# Patient Record
Sex: FEMALE | Race: WHITE | NOT HISPANIC OR LATINO | Employment: FULL TIME | URBAN - METROPOLITAN AREA
[De-identification: names, ages, dates, MRNs, and addresses within clinical notes are randomized per-mention and may not be internally consistent; named-entity substitution may affect disease eponyms.]

---
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
## 2023-08-18 ENCOUNTER — OFFICE VISIT (OUTPATIENT)
Dept: OBSTETRICS AND GYNECOLOGY | Facility: CLINIC | Age: 23
End: 2023-08-18
Payer: COMMERCIAL

## 2023-08-18 VITALS
BODY MASS INDEX: 28.51 KG/M2 | WEIGHT: 146 LBS | SYSTOLIC BLOOD PRESSURE: 114 MMHG | DIASTOLIC BLOOD PRESSURE: 80 MMHG | HEART RATE: 101 BPM

## 2023-08-18 DIAGNOSIS — Z30.432 ENCOUNTER FOR IUD REMOVAL: Primary | ICD-10-CM

## 2023-08-18 RX ORDER — ONDANSETRON 4 MG/1
TABLET, ORALLY DISINTEGRATING ORAL
COMMUNITY
Start: 2023-08-17

## 2023-08-18 RX ORDER — CEPHALEXIN 250 MG/1
CAPSULE ORAL
COMMUNITY
Start: 2023-08-17

## 2023-08-18 RX ORDER — FLUCONAZOLE 150 MG/1
TABLET ORAL
COMMUNITY
Start: 2023-08-17

## 2023-08-18 RX ORDER — KETOROLAC TROMETHAMINE 10 MG/1
TABLET, FILM COATED ORAL
COMMUNITY
Start: 2023-08-17

## 2023-08-18 RX ORDER — TAMSULOSIN HYDROCHLORIDE 0.4 MG/1
CAPSULE ORAL
COMMUNITY
Start: 2023-08-17

## 2023-08-18 NOTE — PROGRESS NOTES
Procedures  IUD Removal Procedure Note    Type of IUD:  Mirena  Date of insertion:  known  Reason for removal:  Desires pregnancy  Other relevant history/information:  none    Procedure Time Documentation  The risks of the procedure were reviewed with the patient including bleeding, infection and unlikely damage to the uterus and the benefits of the procedure were explained to the patient and Written informed consent was obtained    Procedure Details  IUD strings visible:  yes  Local anesthesia:  None,   Tenaculum used:  None  Removal:  IUD strings grasped and IUD removed intact with gentle traction.  The patient tolerated the procedure well.    All appropriate instructions regarding removal were reviewed.    Patient tolerated the procedure well without complications.    Plans for contraception:  no method    Other follow-up needed:  Annual exam    The patient was advised to call for any fever or for prolonged or severe pain or bleeding. She was advised to use OTC ibuprofen as needed for mild to moderate pain.     Jean Senior, APRGERTRUDIS  8/18/2023  09:21 EDT

## 2023-09-27 ENCOUNTER — TELEPHONE (OUTPATIENT)
Dept: OBSTETRICS AND GYNECOLOGY | Facility: CLINIC | Age: 23
End: 2023-09-27
Payer: COMMERCIAL

## 2023-09-27 NOTE — TELEPHONE ENCOUNTER
Provider: DR. MORROW    Caller: GYPSY NEWTON    Relationship to Patient: SELF    Pharmacy:     Phone Number: [phone]    Reason for Call: APPOINTMENT FOR FAMILY PLANNING    When was the patient last seen: 08.18.23    PATIENT CALLING WANTING TO SCHEDULE AN APPOINTMENT WITH DR. MORROW. PATIENT STATED THAT HER GRANDMOTHER AMBER ALTMAN  HAD AN APPOINTMENT WITH DR. MORROW THIS SEPTEMBER AND SAID DR. MORROW WOULD LIKE LIKE TO SEE PATIENT FOR FAMILY PLANNING.      PATIENT CAN BE REACHED [phone]     THANK YOU

## 2023-10-24 NOTE — PROGRESS NOTES
GYN Visit    Chief Complaint   Patient presents with    Discuss Family Planning       HPI:   22 y.o. LMP: Patient's last menstrual period was 10/26/2023 (exact date).     Social History     Substance and Sexual Activity   Sexual Activity Yes    Partners: Male    Birth control/protection: None     Telephone with Jeanine Garcia DO (2023)    IUD out since Aug- used for heavy menses  2 menses since, lasted 5-7 days, changes q1hr to be clean.  Painful- OTC meds help  + faint line w ov pred kits, D13    History: PMHx, Meds, Allergies, PSHx, Social Hx, and POBHx all reviewed and updated.    PHYSICAL EXAM:  /74   Wt 62.1 kg (137 lb)   LMP 10/26/2023 (Exact Date)   BMI 26.76 kg/m²   Facility age limit for growth %verena is 20 years.  General- NAD, alert and oriented, appropriate  Psych- Normal mood, good memory      ASSESSMENT AND PLAN:  Diagnoses and all orders for this visit:    1. Desire for pregnancy (Primary)  -     Inheritest (R) CF/SMA Panel - Blood,; Future  -     Inheritest (R) CF/SMA Panel - Blood, Arm, Left    2. Cerebral palsy  Overview:  Mild, left hand and left toes, limps w exhaustion    Assessment & Plan:  We discussed this should not affect her pregnancy, however at the end of pregnancy when she becomes more fatigued we do need to watch out for her symptoms worsening, and assuring she is not increase her risk for falls      3. Hx of Menorrhagia with regular cycle  Comments:  Improved w IUD- now removed    Cont PNV, healthy lifestyle, and no caffeine    Today we discussed ovulation, and timing intercourse with LH surge    We discussed an appropriate body mass index of less than 35 with pregnancy.  Ideal weight reviewed.  127 pounds or less.    We discussed optional testing of CF/SMA today.  Patient desires testing.          Follow Up:  Return With positive pregnancy test.          Jeanine Garcia DO  10/30/2023    [facility] [place] [facility]  [address]  [address] [place] [address]  Dept: [phone]  Dept Fax: [phone]  Loc: [phone]  Loc Fax: [phone]

## 2023-10-30 ENCOUNTER — OFFICE VISIT (OUTPATIENT)
Dept: OBSTETRICS AND GYNECOLOGY | Facility: CLINIC | Age: 23
End: 2023-10-30
Payer: COMMERCIAL

## 2023-10-30 VITALS — DIASTOLIC BLOOD PRESSURE: 74 MMHG | SYSTOLIC BLOOD PRESSURE: 116 MMHG | BODY MASS INDEX: 26.76 KG/M2 | WEIGHT: 137 LBS

## 2023-10-30 DIAGNOSIS — N92.0 MENORRHAGIA WITH REGULAR CYCLE: ICD-10-CM

## 2023-10-30 DIAGNOSIS — Z31.9 DESIRE FOR PREGNANCY: Primary | ICD-10-CM

## 2023-10-30 DIAGNOSIS — G80.8 OTHER CEREBRAL PALSY: ICD-10-CM

## 2023-10-30 NOTE — ASSESSMENT & PLAN NOTE
We discussed this should not affect her pregnancy, however at the end of pregnancy when she becomes more fatigued we do need to watch out for her symptoms worsening, and assuring she is not increase her risk for falls

## 2023-10-30 NOTE — PATIENT INSTRUCTIONS
Venipuncture Blood Specimen Collection  Venipuncture performed in left arm by Agata Lobato with good hemostasis. Patient tolerated the procedure well without complications.   10/30/23   Agata Lobato

## 2023-11-08 LAB
CITATION REF LAB TEST: NORMAL
ETHNIC BACKGROUND STATED: NORMAL
GENE DIS ANL CARRIER INTERP-IMP: NORMAL
GENE STUDIED ID: NORMAL
LAB DIRECTOR NAME PROVIDER: NORMAL
Lab: NORMAL
MOL DX INTERP BLD/T QL: NORMAL
REASON FOR REFERRAL (NARRATIVE): NORMAL
RECOMMENDATION PATIENT DOC-IMP: NORMAL
REF LAB TEST METHOD: NORMAL
SERVICE CMNT-IMP: NORMAL
SPECIMEN SOURCE: NORMAL

## 2023-11-10 ENCOUNTER — TELEPHONE (OUTPATIENT)
Dept: OBSTETRICS AND GYNECOLOGY | Facility: CLINIC | Age: 23
End: 2023-11-10
Payer: COMMERCIAL

## 2023-11-10 NOTE — TELEPHONE ENCOUNTER
----- Message from Jeanine Garcia DO sent at 11/9/2023  5:08 PM EST -----  Results for cystic fibrosis and spinal muscular atrophy were both negative.  She will not ever need to be tested again for either 1 of these.  Have her contact our office when she gets her first positive home pregnancy test.

## 2023-11-22 NOTE — TELEPHONE ENCOUNTER
Left a message for the patient to discuss her lab results.  Letter sent to the patient to call to discuss.

## 2023-12-15 ENCOUNTER — TELEPHONE (OUTPATIENT)
Dept: OBSTETRICS AND GYNECOLOGY | Facility: CLINIC | Age: 23
End: 2023-12-15
Payer: COMMERCIAL

## 2023-12-15 DIAGNOSIS — O36.80X0 PREGNANCY WITH INCONCLUSIVE FETAL VIABILITY, SINGLE OR UNSPECIFIED FETUS: Primary | ICD-10-CM

## 2023-12-15 DIAGNOSIS — Z32.00 PREGNANCY EXAMINATION OR TEST, PREGNANCY UNCONFIRMED: ICD-10-CM

## 2023-12-15 NOTE — TELEPHONE ENCOUNTER
Pt called stating she got a positive at home pregnancy test. Her LMP Was 11/22. She called in to schedule an appointment for her initial ob. She stated she was told that you would see her for her entire pregnancy and would work her in for the first initial visit. Is this okay? I have attached order for bhcg and ultrasound.

## 2023-12-18 ENCOUNTER — LAB (OUTPATIENT)
Dept: LAB | Facility: HOSPITAL | Age: 23
End: 2023-12-18
Payer: COMMERCIAL

## 2023-12-18 ENCOUNTER — TELEPHONE (OUTPATIENT)
Dept: OBSTETRICS AND GYNECOLOGY | Facility: CLINIC | Age: 23
End: 2023-12-18
Payer: COMMERCIAL

## 2023-12-18 DIAGNOSIS — Z32.00 PREGNANCY EXAMINATION OR TEST, PREGNANCY UNCONFIRMED: ICD-10-CM

## 2023-12-18 DIAGNOSIS — Z3A.01 LESS THAN 8 WEEKS GESTATION OF PREGNANCY: Primary | ICD-10-CM

## 2023-12-18 LAB — HCG INTACT+B SERPL-ACNC: 274.2 MIU/ML

## 2023-12-18 PROCEDURE — 84702 CHORIONIC GONADOTROPIN TEST: CPT

## 2023-12-18 PROCEDURE — 36415 COLL VENOUS BLD VENIPUNCTURE: CPT

## 2023-12-18 NOTE — TELEPHONE ENCOUNTER
Discussed results with pt. She is going to try to go to [facility] on Wednesday to get repeat HCG. She is scheduled for her ultrasound and follow up on 01/09/24. Please sign attached order for repeat hcg.

## 2023-12-18 NOTE — TELEPHONE ENCOUNTER
----- Message from Jeanine Garcia DO sent at 12/18/2023 12:21 PM EST -----  Preg test positive, consistent w early  preg.  Please order a repeat [facility] today or tomorrow and US at approx 6weeks GA w OV after for confirmation of preg.  First tri precautions.

## 2023-12-18 NOTE — TELEPHONE ENCOUNTER
Caller: Fidel NEWTON    Relationship: Self    Best call back number: [phone]    Caller requesting test results: PT    What test was performed: HCG     When was the test performed: 12/18/23    Where was the test performed: HOSPITAL     Additional notes: PT WOULD LIKE TO SPEAK TO SOMEONE REGARDING RESULTS

## 2023-12-20 ENCOUNTER — LAB (OUTPATIENT)
Dept: LAB | Facility: HOSPITAL | Age: 23
End: 2023-12-20
Payer: COMMERCIAL

## 2023-12-20 DIAGNOSIS — Z3A.01 LESS THAN 8 WEEKS GESTATION OF PREGNANCY: ICD-10-CM

## 2023-12-20 LAB — HCG INTACT+B SERPL-ACNC: 689 MIU/ML

## 2023-12-20 PROCEDURE — 36415 COLL VENOUS BLD VENIPUNCTURE: CPT

## 2023-12-20 PROCEDURE — 84702 CHORIONIC GONADOTROPIN TEST: CPT

## 2023-12-31 ENCOUNTER — HOSPITAL ENCOUNTER (EMERGENCY)
Facility: HOSPITAL | Age: 23
Discharge: HOME | End: 2024-01-01
Attending: EMERGENCY MEDICINE | Admitting: EMERGENCY MEDICINE
Payer: COMMERCIAL

## 2023-12-31 ENCOUNTER — APPOINTMENT (OUTPATIENT)
Dept: ULTRASOUND IMAGING | Facility: HOSPITAL | Age: 23
End: 2023-12-31
Payer: COMMERCIAL

## 2023-12-31 DIAGNOSIS — N93.9 VAGINAL BLEEDING: Primary | ICD-10-CM

## 2023-12-31 LAB
ABO GROUP BLD: NORMAL
BASOPHILS # BLD AUTO: 0.05 10*3/MM3 (ref 0–0.2)
BASOPHILS NFR BLD AUTO: 0.5 % (ref 0–1.5)
DEPRECATED RDW RBC AUTO: 39.8 FL (ref 37–54)
EOSINOPHIL # BLD AUTO: 0.3 10*3/MM3 (ref 0–0.4)
EOSINOPHIL NFR BLD AUTO: 3 % (ref 0.3–6.2)
ERYTHROCYTE [DISTWIDTH] IN BLOOD BY AUTOMATED COUNT: 12.1 % (ref 12.3–15.4)
HCG INTACT+B SERPL-ACNC: NORMAL MIU/ML
HCT VFR BLD AUTO: 39.5 % (ref 34–46.6)
HGB BLD-MCNC: 13 G/DL (ref 12–15.9)
HOLD SPECIMEN: NORMAL
HOLD SPECIMEN: NORMAL
IMM GRANULOCYTES # BLD AUTO: 0.02 10*3/MM3 (ref 0–0.05)
IMM GRANULOCYTES NFR BLD AUTO: 0.2 % (ref 0–0.5)
LYMPHOCYTES # BLD AUTO: 2.89 10*3/MM3 (ref 0.7–3.1)
LYMPHOCYTES NFR BLD AUTO: 28.8 % (ref 19.6–45.3)
MCH RBC QN AUTO: 29.5 PG (ref 26.6–33)
MCHC RBC AUTO-ENTMCNC: 32.9 G/DL (ref 31.5–35.7)
MCV RBC AUTO: 89.6 FL (ref 79–97)
MONOCYTES # BLD AUTO: 1 10*3/MM3 (ref 0.1–0.9)
MONOCYTES NFR BLD AUTO: 10 % (ref 5–12)
NEUTROPHILS NFR BLD AUTO: 5.78 10*3/MM3 (ref 1.7–7)
NEUTROPHILS NFR BLD AUTO: 57.5 % (ref 42.7–76)
NRBC BLD AUTO-RTO: 0 /100 WBC (ref 0–0.2)
PLATELET # BLD AUTO: 352 10*3/MM3 (ref 140–450)
PMV BLD AUTO: 9.9 FL (ref 6–12)
RBC # BLD AUTO: 4.41 10*6/MM3 (ref 3.77–5.28)
RH BLD: POSITIVE
WBC NRBC COR # BLD AUTO: 10.04 10*3/MM3 (ref 3.4–10.8)
WHOLE BLOOD HOLD COAG: NORMAL
WHOLE BLOOD HOLD SPECIMEN: NORMAL

## 2023-12-31 PROCEDURE — 86901 BLOOD TYPING SEROLOGIC RH(D): CPT

## 2023-12-31 PROCEDURE — 84702 CHORIONIC GONADOTROPIN TEST: CPT | Performed by: EMERGENCY MEDICINE

## 2023-12-31 PROCEDURE — 76817 TRANSVAGINAL US OBSTETRIC: CPT

## 2023-12-31 PROCEDURE — 86900 BLOOD TYPING SEROLOGIC ABO: CPT

## 2023-12-31 PROCEDURE — 85025 COMPLETE CBC W/AUTO DIFF WBC: CPT | Performed by: EMERGENCY MEDICINE

## 2023-12-31 PROCEDURE — 99284 EMERGENCY DEPT VISIT MOD MDM: CPT

## 2023-12-31 PROCEDURE — 36415 COLL VENOUS BLD VENIPUNCTURE: CPT

## 2023-12-31 RX ORDER — SODIUM CHLORIDE 0.9 % (FLUSH) 0.9 %
10 SYRINGE (ML) INJECTION AS NEEDED
Status: DISCONTINUED | OUTPATIENT
Start: 2023-12-31 | End: 2024-01-01 | Stop reason: HOSPADM

## 2024-01-01 VITALS
HEART RATE: 87 BPM | TEMPERATURE: 98.7 F | SYSTOLIC BLOOD PRESSURE: 132 MMHG | HEIGHT: 60 IN | OXYGEN SATURATION: 99 % | DIASTOLIC BLOOD PRESSURE: 78 MMHG | BODY MASS INDEX: 25.58 KG/M2 | RESPIRATION RATE: 18 BRPM | WEIGHT: 130.29 LBS

## 2024-01-01 NOTE — DISCHARGE INSTRUCTIONS
Take all medications as prescribed. Read and follow educational instructions provided to you in discharge packet. If symptoms worsen or fail to improve as anticipated return to ER.  Follow-up with your OB/GYN soon as possible. patient agrees to treatment plan.

## 2024-01-01 NOTE — ED PROVIDER NOTES
Time: 11:42 PM EST  Date of encounter:  12/31/2023  Independent Historian/Clinical History and Information was obtained by:   Patient  Chief Complaint: Vaginal bleeding, pregnant    History is limited by: N/A    History of Present Illness:  Patient is a 23 y.o. year old female who presents to the emergency department for evaluation of vaginal bleeding.  States that she started spotting earlier this morning after intercourse and now has saturated 2 pads.  Denies abdominal cramping but has lower back pain.  Patient states that she is 6 weeks pregnant.  Has not been evaluated by her OB/GYN.  Last menstrual period was November 22, 2023.  This is her first pregnancy.    HPI    Patient Care Team  Primary Care Provider: Snellen, Danielle, APRN    Past Medical History:     No Known Allergies  Past Medical History:   Diagnosis Date    Anxiety     Kidney stone     Migraine      Past Surgical History:   Procedure Laterality Date    KIDNEY STONE SURGERY       Family History   Problem Relation Age of Onset    Breast cancer Maternal Aunt         Mat. Great Great Aunt    Colon cancer Maternal Aunt         Mat. Great Great Aunt    Kidney cancer Maternal Uncle         Mat. Great Great Uncle    Lung cancer Maternal Uncle         Mat Great Great Uncle    Breast cancer Maternal Cousin         3rd Cousin    Thyroid disease Neg Hx     Ovarian cancer Neg Hx     Uterine cancer Neg Hx     Pulmonary embolism Neg Hx     Deep vein thrombosis Neg Hx        Home Medications:  Prior to Admission medications    Not on File        Social History:   Social History     Tobacco Use    Smoking status: Never    Smokeless tobacco: Never   Vaping Use    Vaping Use: Never used   Substance Use Topics    Alcohol use: Never    Drug use: Never         Review of Systems:  Review of Systems   Constitutional:  Negative for chills and fever.   HENT:  Negative for congestion, ear pain and sore throat.    Eyes:  Negative for pain.   Respiratory:  Negative for cough,  "chest tightness and shortness of breath.    Cardiovascular:  Negative for chest pain.   Gastrointestinal:  Negative for abdominal pain, diarrhea, nausea and vomiting.   Genitourinary:  Positive for vaginal bleeding. Negative for flank pain, hematuria and vaginal discharge.   Musculoskeletal:  Positive for back pain. Negative for joint swelling.   Skin:  Negative for pallor.   Neurological:  Negative for seizures and headaches.   All other systems reviewed and are negative.         Physical Exam:  /78   Pulse 87   Temp 98.7 °F (37.1 °C) (Oral)   Resp 18   Ht 152.4 cm (60\")   Wt 59.1 kg (130 lb 4.7 oz)   LMP 11/22/2023   SpO2 99%   BMI 25.45 kg/m²     Physical Exam  Vitals and nursing note reviewed. Exam conducted with a chaperone present.   Constitutional:       General: She is not in acute distress.     Appearance: Normal appearance. She is not toxic-appearing.   HENT:      Head: Normocephalic and atraumatic.      Mouth/Throat:      Mouth: Mucous membranes are moist.   Eyes:      General: No scleral icterus.     Conjunctiva/sclera: Conjunctivae normal.   Cardiovascular:      Rate and Rhythm: Normal rate and regular rhythm.      Pulses: Normal pulses.      Heart sounds: Normal heart sounds.   Pulmonary:      Effort: Pulmonary effort is normal. No respiratory distress.      Breath sounds: Normal breath sounds. No wheezing.   Abdominal:      General: Abdomen is flat.      Palpations: Abdomen is soft.      Tenderness: There is no abdominal tenderness.   Genitourinary:     Exam position: Supine.      Pubic Area: No rash.       Comments: Chaperone present Anna Rincon RN.  Blood noted in vaginal vault.  Musculoskeletal:         General: Normal range of motion.      Cervical back: Normal range of motion and neck supple.   Skin:     General: Skin is warm and dry.      Capillary Refill: Capillary refill takes less than 2 seconds.   Neurological:      Mental Status: She is alert and oriented to person, " place, and time. Mental status is at baseline.   Psychiatric:         Judgment: Judgment normal.         Vital signs were reviewed under triage note.            Procedures:  Procedures      Medical Decision Making:      Comorbidities that affect care:    Kidney stone, anxiety, migraine    External Notes reviewed:    Previous Clinic Note: 10/30/2023 GYN to discuss family planning      The following orders were placed and all results were independently analyzed by me:  Orders Placed This Encounter   Procedures    US Ob Transvaginal    Bandy Draw    hCG, Quantitative, Pregnancy    CBC Auto Differential    NPO Diet NPO Type: Strict NPO    Undress & Gown    Vital Signs    Orthostatic Blood Pressure    Supplies To Bedside - Notify MD When Ready- Pelvic Cart / Set Up    Pulse Oximetry    Oxygen Therapy- Nasal Cannula; Titrate 1-6 LPM Per SpO2; 90 - 95%    ABO / Rh    Insert Peripheral IV    CBC & Differential    Green Top (Gel)    Lavender Top    Gold Top - SST    Light Blue Top       Medications Given in the Emergency Department:  Medications   sodium chloride 0.9 % flush 10 mL (has no administration in time range)        ED Course:        Labs:    Lab Results (last 24 hours)       Procedure Component Value Units Date/Time    CBC & Differential [[id number]]  (Abnormal) Collected: 12/31/23 2103    Specimen: Blood Updated: 12/31/23 2117    Narrative:      The following orders were created for panel order CBC & Differential.  Procedure                               Abnormality         Status                     ---------                               -----------         ------                     CBC Auto Differential[[id number]]        Abnormal            Final result                 Please view results for these tests on the individual orders.    hCG, Quantitative, Pregnancy [[id number]] Collected: 12/31/23 2103    Specimen: Blood Updated: 12/31/23 2149     HCG Quantitative 21,247.00 mIU/mL     Narrative:      HCG Ranges by  Gestational Age    Females - non-pregnant premenopausal   </= 1mIU/mL HCG  Females - postmenopausal               </= 7mIU/mL HCG    3 Weeks       5.4   -      72 mIU/mL  4 Weeks      10.2   -     708 mIU/mL  5 Weeks       217   -   8,245 mIU/mL  6 Weeks       152   -  32,177 mIU/mL  7 Weeks     4,059   - 153,767 mIU/mL  8 Weeks    31,366   - 149,094 mIU/mL  9 Weeks    59,109   - 135,901 mIU/mL  10 Weeks   44,186   - 170,409 mIU/mL  12 Weeks   27,107   - 201,615 mIU/mL  14 Weeks   24,302   -  93,646 mIU/mL  15 Weeks   12,540   -  69,747 mIU/mL  16 Weeks    8,904   -  55,332 mIU/mL  17 Weeks    8,240   -  51,793 mIU/mL  18 Weeks    9,649   -  55,271 mIU/mL      CBC Auto Differential [[id number]]  (Abnormal) Collected: 12/31/23 2103    Specimen: Blood Updated: 12/31/23 2117     WBC 10.04 10*3/mm3      RBC 4.41 10*6/mm3      Hemoglobin 13.0 g/dL      Hematocrit 39.5 %      MCV 89.6 fL      MCH 29.5 pg      MCHC 32.9 g/dL      RDW 12.1 %      RDW-SD 39.8 fl      MPV 9.9 fL      Platelets 352 10*3/mm3      Neutrophil % 57.5 %      Lymphocyte % 28.8 %      Monocyte % 10.0 %      Eosinophil % 3.0 %      Basophil % 0.5 %      Immature Grans % 0.2 %      Neutrophils, Absolute 5.78 10*3/mm3      Lymphocytes, Absolute 2.89 10*3/mm3      Monocytes, Absolute 1.00 10*3/mm3      Eosinophils, Absolute 0.30 10*3/mm3      Basophils, Absolute 0.05 10*3/mm3      Immature Grans, Absolute 0.02 10*3/mm3      nRBC 0.0 /100 WBC              Imaging:    US Ob Transvaginal    Result Date: 1/1/2024  PROCEDURE: US OB TRANSVAGINAL  COMPARISON: None.  INDICATIONS: 23-year-old female w/ vaginal bleeding in pregnancy; the quantitative beta-hCG is 21,247 mIU/mL.  TECHNIQUE: Ultrasound examination of the pelvis was performed, using endovaginal/transvaginal technique.  A limited obstetric survey was performed.   FINDINGS: Two-dimensional grayscale images as well as color and spectral Doppler analysis are provided for review.  Again,  transvaginal/endovaginal technique was utilized.  A single intrauterine gestational sac is identified.  It contains a single yolk sac with a maximum diameter of 2.7 cm.  The mean gestational sac diameter of 1.26 cm predicts a gestational age of 5 weeks, 3 days.  The gestational sac contains an early single embryonic (or fetal) pole.  The mean crown-rump length is 0.18 cm, which is out of range (OOR) for prediction of gestational age.  No embryonic cardiac activity is detected at this time.  The clinical gestational age, based upon the last menstrual period (LMP) of 11/22/2023, is 5 weeks, 4 days.  The estimated delivery date clinically is 8/28/2024.  The ultrasound-predicted delivery date is 8/29/2024.  The endocervical canal is nondilated and measures 4.2 cm in length.  Minimal, if any, free fluid is identified in the cul-de-sac.  The uterus measures 7.2 x 3.9 x 4.3 cm.  The right ovary measures 3.2 x 2.4 x 2.3 cm.  The left ovary measures 2.7 x 0.9 x 1.3 cm.  The uterine volume is approximately 62.6 mL.  The right ovarian volume is approximately 9 mL.  The left ovarian volume is approximately 1.8 mL.  There is a suspected 2.1 cm right corpus luteal cyst, as seen on image 29.  There is a left adnexal cyst, which measures about 1.5 cm in greatest diameter.  No perigestational hemorrhage is seen.  No suspicious uterine or adnexal mass.  No ovarian torsion is identified.  That is, normal blood flow involves the bilateral ovaries by duplex ultrasound examination.         1. A single intrauterine gestational sac is identified, which contains a single yolk sac and a suspected single early embryonic pole.  No embryonic cardiac activity is detected at this time.  Consider close interval clinical, lab, and imaging follow-up of the findings to ensure a benign progression and to ensure a viable intrauterine gestation.  2. No suspicious uterine or adnexal masses are seen. 3. No ovarian torsion is identified, bilaterally. 4.  Minimal (if any) free fluid is seen in the cul-de-sac. 5. The other findings are as detailed above.    Please note that portions of this note were completed with a voice recognition program.  RAUL VELASQUEZ JR, MD       Electronically Signed and Approved By: RAUL VELASQUEZ JR, MD on 1/01/2024 at 0:24                 Differential Diagnosis and Discussion:    Vaginal Bleeding: Differential diagnosis includes but is not limited to foreign body, tumor, vaginitis, dysfunctional uterine bleeding, endocrine abnormalities, coagulation disorder, systemic illness, polyps, complications of pregnancy (possible ectopic pregnancy).    All labs were reviewed and interpreted by me.  Ultrasound impression was interpreted by me.     MDM     Amount and/or Complexity of Data Reviewed  Clinical lab tests: reviewed  Tests in the radiology section of CPT®: reviewed    Risk of Complications, Morbidity, and/or Mortality  Presenting problems: moderate  Diagnostic procedures: moderate  Management options: moderate         Patient Care Considerations:    CT ABDOMEN AND PELVIS: I considered ordering a CT scan of the abdomen and pelvis however no abdominal pain.      Consultants/Shared Management Plan:    None    Social Determinants of Health:    Patient is independent, reliable, and has access to care.       Disposition and Care Coordination:    Discharged: The patient is suitable and stable for discharge with no need for consideration of observation or admission.    [unfilled]  I have explained discharge medications and the need for follow up with the patient/caretakers. This was also printed in the discharge instructions. Patient was discharged with the following medications and follow up:      Medication List      No changes were made to your prescriptions during this visit.      Snellen, Danielle, APRN  [address] [address] [phone]    Schedule an appointment as soon as possible for a visit   As needed        Final diagnoses:   Vaginal bleeding        ED Disposition       ED Disposition   Discharge    Condition   Stable    Comment   --               This medical record created using voice recognition software.             Yuridia León, APRGERTRUDIS  01/01/24 0049       Yuridia León, APRN  01/01/24 0153

## 2024-01-02 ENCOUNTER — TELEPHONE (OUTPATIENT)
Dept: OBSTETRICS AND GYNECOLOGY | Facility: CLINIC | Age: 24
End: 2024-01-02
Payer: COMMERCIAL

## 2024-01-02 NOTE — TELEPHONE ENCOUNTER
Contacted patient to let her know that I had spoken with her insurance and [facility] about the bill she received for CF/SMA testing. It looked as though the ICD-10 codes were incorrect. I spoke with Zoe at [employer] and sent her the corrected info and they will correct the claim and resubmit it.

## 2024-01-03 NOTE — PROGRESS NOTES
GYN Visit    Chief Complaint   Patient presents with    Bleeding       HPI:   23 y.o. LMP: Patient's last menstrual period was 2023.     Social History     Substance and Sexual Activity   Sexual Activity Yes    Partners: Male     US Ob Transvaginal (2023 22:43) ultrasound in ER  showed Gsac and yolk sac.  Patient had vaginal bleeding.  Beta-hCG levels have been rising appropriately.  hCG, Quantitative, Pregnancy (2023 21:03)    US today: viable IUP cw JEANINE by LMP    Nausea and vomiting  No VB since ER visit    History: PMHx, Meds, Allergies, PSHx, Social Hx, and POBHx all reviewed and updated.    PHYSICAL EXAM:  /83 (BP Location: Right arm, Patient Position: Sitting)   Pulse 82   Wt 59.7 kg (131 lb 9.6 oz)   LMP 2023   SpO2 98%   BMI 25.70 kg/m²   Facility age limit for growth %verena is 20 years.  General- NAD, alert and oriented, appropriate  Psych- Normal mood, good memory      ASSESSMENT AND PLAN:  Diagnoses and all orders for this visit:    1. Bleeding in early pregnancy (Primary)    2. Nausea and vomiting during pregnancy  -     ondansetron (ZOFRAN) 4 MG tablet; Take 1 tablet by mouth Every 8 (Eight) Hours As Needed for Nausea or Vomiting.  Dispense: 30 tablet; Refill: 1  -     doxylamine (UNISOM) 25 MG tablet; Take 1 tablet by mouth Daily As Needed for Sleep or Nausea (or anxiety). May take and extra 1/2 tablet PRN persistent nausea or anxiety  Dispense: 30 tablet; Refill: 1  -     vitamin B-6 (PYRIDOXINE) 25 MG tablet; Take 1 tablet by mouth 2 (Two) Times a Day As Needed (Nausea).  Dispense: 60 tablet; Refill: 1    Other orders  -     Prenatal Vit-Fe Sulfate-FA-DHA (Prenatal Vitamin/Min +DHA) 27-0.8-200 MG capsule; Take 1 each by mouth Daily.  Dispense: 90 capsule; Refill: 4    Discussed appropriate diet with nausea and vomiting in pregnancy.  Increased carbohydrates and easily digestible foods.  Avoid spicy, high fatty, and vegetables.        Follow  Up:  Return in about 4 weeks (around 2/6/2024) for New OB OV, then Ov 4weeks later.          Jeanine Garcia,   01/09/2024    [facility] [place] [facility]  [address] [place] [address]  Dept: [phone]  Dept Fax: [phone]  Loc: [phone]  Loc Fax: [phone]

## 2024-01-09 ENCOUNTER — OFFICE VISIT (OUTPATIENT)
Dept: OBSTETRICS AND GYNECOLOGY | Facility: CLINIC | Age: 24
End: 2024-01-09
Payer: COMMERCIAL

## 2024-01-09 VITALS
DIASTOLIC BLOOD PRESSURE: 83 MMHG | BODY MASS INDEX: 25.7 KG/M2 | WEIGHT: 131.6 LBS | OXYGEN SATURATION: 98 % | HEART RATE: 82 BPM | SYSTOLIC BLOOD PRESSURE: 120 MMHG

## 2024-01-09 DIAGNOSIS — O20.9 BLEEDING IN EARLY PREGNANCY: Primary | ICD-10-CM

## 2024-01-09 DIAGNOSIS — O21.9 NAUSEA AND VOMITING DURING PREGNANCY: ICD-10-CM

## 2024-01-09 PROBLEM — F41.9 ANXIETY: Status: ACTIVE | Noted: 2024-01-09

## 2024-01-09 PROBLEM — R10.9 ACUTE LEFT FLANK PAIN: Status: RESOLVED | Noted: 2017-03-03 | Resolved: 2024-01-09

## 2024-01-09 PROBLEM — Z34.80 SUPERVISION OF OTHER NORMAL PREGNANCY, ANTEPARTUM: Status: ACTIVE | Noted: 2024-01-09

## 2024-01-09 RX ORDER — ONDANSETRON 4 MG/1
4 TABLET, FILM COATED ORAL EVERY 8 HOURS PRN
Qty: 30 TABLET | Refills: 1 | Status: SHIPPED | OUTPATIENT
Start: 2024-01-09

## 2024-01-09 RX ORDER — DIPHENHYDRAMINE HYDROCHLORIDE 25 MG/1
25 CAPSULE ORAL 2 TIMES DAILY PRN
Qty: 60 TABLET | Refills: 1 | Status: SHIPPED | OUTPATIENT
Start: 2024-01-09

## 2024-01-09 RX ORDER — CHOLECALCIFEROL (VITAMIN D3) 25 MCG
1 TABLET,CHEWABLE ORAL DAILY
Qty: 90 CAPSULE | Refills: 4 | Status: SHIPPED | OUTPATIENT
Start: 2024-01-09

## 2024-01-31 NOTE — PROGRESS NOTES
OB Initial Visit    CC- Here for care of current pregnancy, first visit  Chief Complaint   Patient presents with    Initial Prenatal Visit       Subjective:  23 y.o. presenting for her first obstetrical visit.    LMP: Patient's last menstrual period was 11/22/2023.     Office Visit with Jeanine Garcia DO (01/09/2024)    Pt complains of  nausea, using B6 and zofran  Constipation  Anxiety worsening    Reviewed and updated:  OBHx, GYNHx (STDs), PMHx, Medications, Allergies, PSHx, Social Hx, Preventative Hx (PAP), Vaccine Hx, Genetic Hx (pt, FOB, both families).        Objective:  /85   Wt 58.1 kg (128 lb)   LMP 11/22/2023   BMI 25.00 kg/m²   General- NAD, alert and oriented, appropriate  Psych- Normal mood, good memory  Neck- No masses, no thyroid enlargement  CV- Regular rhythm, no murmurs  Resp- CTA to bases, no wheezes  Abdomen- Soft, non distended, non tender, no masses    Breast left- No mass, non tender, no nipple discharge, no axillary or supraclavicular nodes palpable.    Breast right- No mass, non tender, no nipple discharge, no axillary or supraclavicular nodes palpable.        Chaperone present during pelvic exam.   External genitalia- Normal, no lesions  Urethra- Normal, no masses, non tender  Vagina- Normal, no discharge  Bladder- Normal, no masses, non tender  Cervix- Normal, no lesions, no discharge, no CMT  Uterus- Normal shape and consistency, non tender, Consistent with dates, Bedside US consistent with dates.  -160.   Adnexa- Normal, no mass, non tender    Lymphatic- No palpable neck, axillary, or groin nodes  Extremities- No edema, no cyanosis    Skin- No lesions, no rashes    Assessment and Plan:  Unknown   Diagnoses and all orders for this visit:    1. Supervision of other normal pregnancy, antepartum (Primary)  Overview:  JEANINE finalized: 8/28/24 by LMP and 6week US    Genetic testing (NIPS-Quad)/CF/AFP:  CF/SMA neg.  Desires NIPS 2/5/2024.  Considering AFP    COVID: Recommended  Flu:  Recommended  Tdap:    28-32 weeks repeat TPA:  ? Desires Sterilization:    Anatomy US:  FU US:    PROBLEM LIST/PLAN:   Anxiety- no meds.  Prepreg Zoloft.    2/5/2024 increased anxiety.  No SI or HI.  Discussed R/B/RBOBY.Rx Zoloft 25mg  Hx of kidney stones- stay hydrated, avoid TUMS  FOB brother w bleed do and kids w autism- 2/5/2024 recommend get info, FOB should be tested for genetic do depending on FHx      Orders:  -     Chlamydia trachomatis, Neisseria gonorrhoeae, PCR - Swab, Cervix  -     OB Panel With HIV  -     Urine Drug Screen - Urine, Clean Catch  -     Urine Culture - Urine, Urine, Clean Catch  -     POC Urinalysis Dipstick  -     PAP, Liquid Based ([facility] Only)  -     [id number] PLUS Core+SCA+ESS - Blood,; Future  -     [id number] PLUS Core+SCA+ESS - Blood, Arm, Left    2. Nausea and vomiting in pregnancy  Comments:  Discussed option IV hydration, declines today, pt will call if needs  Orders:  -     promethazine (PHENERGAN) 25 MG tablet; Take 1 tablet by mouth Every 6 (Six) Hours As Needed for Nausea or Vomiting.  Dispense: 30 tablet; Refill: 1    3. Constipation  Comments:  discussed zofran can worsen, meds safe in preg reviewed    4. Anxiety  -     sertraline (ZOLOFT) 25 MG tablet; Take 1 tablet by mouth Daily.  Dispense: 30 tablet; Refill: 2        Counseling:   Nutrition discussed, calories, activity/exercise in pregnancy  Discussed dietary restrictions/safety food preparation in pregnancy  Reviewed what to expect prenatal visits, office providers (female and male) and covering [facility]/Dr. Anand  Appropriate trimester precautions provided, N/V, vag bleeding, cramping  VACCINE importance in pregnancy discussed.  Maternal and fetal risk of not being vaccinated reviewed NLT increased risk maternal/fetal severity of illness/death, PTD, CS, hemorrhage, HTN, possible IUFD.  Significant maternal and fetal/infant benefit w vaccination.  FDA approval and ACOG/SMFM/CDC strong recommendation in  pregnancy.  Questions answered.   ANXIETY/DEPRESSION- We discussed treatment options R/B/A/SE/E expectant, THERAPY, SSRI, vs SSRI + Therapy.  Non medical options usually recommended first.  ROBBY reviewed.    First trimester precautions, bleeding, cramping, nausea and vomiting    Return in about 4 weeks (around 3/4/2024) for FU OB x2.            Jeanine Garcia,   02/05/2024    [facility] [place] [facility]  [facility] [place] [address]  Dept: [phone]  Dept Fax: [phone]  Loc: [phone]  Loc Fax: [phone]

## 2024-02-05 ENCOUNTER — INITIAL PRENATAL (OUTPATIENT)
Dept: OBSTETRICS AND GYNECOLOGY | Facility: CLINIC | Age: 24
End: 2024-02-05
Payer: COMMERCIAL

## 2024-02-05 VITALS — DIASTOLIC BLOOD PRESSURE: 85 MMHG | WEIGHT: 128 LBS | BODY MASS INDEX: 25 KG/M2 | SYSTOLIC BLOOD PRESSURE: 118 MMHG

## 2024-02-05 DIAGNOSIS — F41.9 ANXIETY: ICD-10-CM

## 2024-02-05 DIAGNOSIS — O21.9 NAUSEA AND VOMITING IN PREGNANCY: ICD-10-CM

## 2024-02-05 DIAGNOSIS — Z34.80 SUPERVISION OF OTHER NORMAL PREGNANCY, ANTEPARTUM: Primary | ICD-10-CM

## 2024-02-05 DIAGNOSIS — K59.00 CONSTIPATION, UNSPECIFIED CONSTIPATION TYPE: ICD-10-CM

## 2024-02-05 LAB
ABO GROUP BLD: NORMAL
AMPHET+METHAMPHET UR QL: NEGATIVE
BARBITURATES UR QL SCN: NEGATIVE
BASOPHILS # BLD AUTO: 0.03 10*3/MM3 (ref 0–0.2)
BASOPHILS NFR BLD AUTO: 0.3 % (ref 0–1.5)
BENZODIAZ UR QL SCN: NEGATIVE
BLD GP AB SCN SERPL QL: NEGATIVE
C TRACH RRNA CVX QL NAA+PROBE: NOT DETECTED
CANNABINOIDS SERPL QL: NEGATIVE
COCAINE UR QL: NEGATIVE
DEPRECATED RDW RBC AUTO: 41.3 FL (ref 37–54)
EOSINOPHIL # BLD AUTO: 0.05 10*3/MM3 (ref 0–0.4)
EOSINOPHIL NFR BLD AUTO: 0.5 % (ref 0.3–6.2)
ERYTHROCYTE [DISTWIDTH] IN BLOOD BY AUTOMATED COUNT: 12.4 % (ref 12.3–15.4)
FENTANYL UR-MCNC: NEGATIVE NG/ML
GLUCOSE UR STRIP-MCNC: NEGATIVE MG/DL
HBV SURFACE AG SERPL QL IA: NORMAL
HCT VFR BLD AUTO: 38.5 % (ref 34–46.6)
HCV AB SER DONR QL: NORMAL
HGB BLD-MCNC: 12.8 G/DL (ref 12–15.9)
HIV 1+2 AB+HIV1 P24 AG SERPL QL IA: NORMAL
IMM GRANULOCYTES # BLD AUTO: 0.03 10*3/MM3 (ref 0–0.05)
IMM GRANULOCYTES NFR BLD AUTO: 0.3 % (ref 0–0.5)
LYMPHOCYTES # BLD AUTO: 1.83 10*3/MM3 (ref 0.7–3.1)
LYMPHOCYTES NFR BLD AUTO: 19.7 % (ref 19.6–45.3)
MCH RBC QN AUTO: 30.4 PG (ref 26.6–33)
MCHC RBC AUTO-ENTMCNC: 33.2 G/DL (ref 31.5–35.7)
MCV RBC AUTO: 91.4 FL (ref 79–97)
METHADONE UR QL SCN: NEGATIVE
MONOCYTES # BLD AUTO: 0.72 10*3/MM3 (ref 0.1–0.9)
MONOCYTES NFR BLD AUTO: 7.7 % (ref 5–12)
N GONORRHOEA RRNA SPEC QL NAA+PROBE: NOT DETECTED
NEUTROPHILS NFR BLD AUTO: 6.64 10*3/MM3 (ref 1.7–7)
NEUTROPHILS NFR BLD AUTO: 71.5 % (ref 42.7–76)
NRBC BLD AUTO-RTO: 0 /100 WBC (ref 0–0.2)
OPIATES UR QL: NEGATIVE
OXYCODONE UR QL SCN: NEGATIVE
PLATELET # BLD AUTO: 309 10*3/MM3 (ref 140–450)
PMV BLD AUTO: 10.3 FL (ref 6–12)
PROT UR STRIP-MCNC: NEGATIVE MG/DL
RBC # BLD AUTO: 4.21 10*6/MM3 (ref 3.77–5.28)
RH BLD: POSITIVE
T PALLIDUM IGG SER QL: NORMAL
WBC NRBC COR # BLD AUTO: 9.3 10*3/MM3 (ref 3.4–10.8)

## 2024-02-05 PROCEDURE — 80307 DRUG TEST PRSMV CHEM ANLYZR: CPT | Performed by: OBSTETRICS & GYNECOLOGY

## 2024-02-05 PROCEDURE — 0501F PRENATAL FLOW SHEET: CPT | Performed by: OBSTETRICS & GYNECOLOGY

## 2024-02-05 PROCEDURE — 86850 RBC ANTIBODY SCREEN: CPT | Performed by: OBSTETRICS & GYNECOLOGY

## 2024-02-05 PROCEDURE — 86900 BLOOD TYPING SEROLOGIC ABO: CPT | Performed by: OBSTETRICS & GYNECOLOGY

## 2024-02-05 PROCEDURE — 87340 HEPATITIS B SURFACE AG IA: CPT | Performed by: OBSTETRICS & GYNECOLOGY

## 2024-02-05 PROCEDURE — 85025 COMPLETE CBC W/AUTO DIFF WBC: CPT | Performed by: OBSTETRICS & GYNECOLOGY

## 2024-02-05 PROCEDURE — 87086 URINE CULTURE/COLONY COUNT: CPT | Performed by: OBSTETRICS & GYNECOLOGY

## 2024-02-05 PROCEDURE — G0432 EIA HIV-1/HIV-2 SCREEN: HCPCS | Performed by: OBSTETRICS & GYNECOLOGY

## 2024-02-05 PROCEDURE — G0123 SCREEN CERV/VAG THIN LAYER: HCPCS

## 2024-02-05 PROCEDURE — 86780 TREPONEMA PALLIDUM: CPT | Performed by: OBSTETRICS & GYNECOLOGY

## 2024-02-05 PROCEDURE — 86803 HEPATITIS C AB TEST: CPT | Performed by: OBSTETRICS & GYNECOLOGY

## 2024-02-05 PROCEDURE — 36415 COLL VENOUS BLD VENIPUNCTURE: CPT | Performed by: OBSTETRICS & GYNECOLOGY

## 2024-02-05 PROCEDURE — 87591 N.GONORRHOEAE DNA AMP PROB: CPT | Performed by: OBSTETRICS & GYNECOLOGY

## 2024-02-05 PROCEDURE — 87491 CHLMYD TRACH DNA AMP PROBE: CPT | Performed by: OBSTETRICS & GYNECOLOGY

## 2024-02-05 PROCEDURE — 86901 BLOOD TYPING SEROLOGIC RH(D): CPT | Performed by: OBSTETRICS & GYNECOLOGY

## 2024-02-05 RX ORDER — SERTRALINE HYDROCHLORIDE 25 MG/1
25 TABLET, FILM COATED ORAL DAILY
Qty: 30 TABLET | Refills: 2 | Status: SHIPPED | OUTPATIENT
Start: 2024-02-05

## 2024-02-05 RX ORDER — PROMETHAZINE HYDROCHLORIDE 25 MG/1
25 TABLET ORAL EVERY 6 HOURS PRN
Qty: 30 TABLET | Refills: 1 | Status: SHIPPED | OUTPATIENT
Start: 2024-02-05

## 2024-02-05 RX ORDER — CHOLECALCIFEROL (VITAMIN D3) 25 MCG
1 TABLET,CHEWABLE ORAL DAILY
COMMUNITY
Start: 2024-01-09

## 2024-02-05 NOTE — PATIENT INSTRUCTIONS
Venipuncture Blood Specimen Collection  Venipuncture performed in right arm by Agata Lobato with good hemostasis. Patient tolerated the procedure well without complications.   02/05/24   Agata Lobato

## 2024-02-06 ENCOUNTER — PATIENT ROUNDING (BHMG ONLY) (OUTPATIENT)
Dept: OBSTETRICS AND GYNECOLOGY | Facility: CLINIC | Age: 24
End: 2024-02-06
Payer: COMMERCIAL

## 2024-02-06 LAB — BACTERIA SPEC AEROBE CULT: NORMAL

## 2024-02-06 NOTE — PROGRESS NOTES
A My-Chart message has been sent to the patient for PATIENT ROUNDING with [facility].

## 2024-02-07 PROBLEM — O09.899 RUBELLA NON-IMMUNE STATUS, ANTEPARTUM: Status: ACTIVE | Noted: 2024-02-07

## 2024-02-07 PROBLEM — Z28.39 RUBELLA NON-IMMUNE STATUS, ANTEPARTUM: Status: ACTIVE | Noted: 2024-02-07

## 2024-02-07 LAB — RUBV IGG SERPL IA-ACNC: <0.9 INDEX

## 2024-02-08 ENCOUNTER — HOSPITAL ENCOUNTER (EMERGENCY)
Facility: HOSPITAL | Age: 24
Discharge: HOME | End: 2024-02-08
Attending: EMERGENCY MEDICINE
Payer: COMMERCIAL

## 2024-02-08 VITALS
HEIGHT: 66 IN | HEART RATE: 104 BPM | RESPIRATION RATE: 18 BRPM | DIASTOLIC BLOOD PRESSURE: 87 MMHG | BODY MASS INDEX: 18.81 KG/M2 | OXYGEN SATURATION: 100 % | WEIGHT: 117.06 LBS | SYSTOLIC BLOOD PRESSURE: 124 MMHG | TEMPERATURE: 98 F

## 2024-02-08 DIAGNOSIS — N39.0 ACUTE UTI: ICD-10-CM

## 2024-02-08 DIAGNOSIS — O21.9 NAUSEA AND VOMITING IN PREGNANCY: Primary | ICD-10-CM

## 2024-02-08 LAB
ALBUMIN SERPL-MCNC: 4.5 G/DL (ref 3.5–5.2)
ALBUMIN/GLOB SERPL: 1.4 G/DL
ALP SERPL-CCNC: 60 U/L (ref 39–117)
ALT SERPL W P-5'-P-CCNC: 10 U/L (ref 1–33)
ANION GAP SERPL CALCULATED.3IONS-SCNC: 14.7 MMOL/L (ref 5–15)
AST SERPL-CCNC: 17 U/L (ref 1–32)
BACTERIA UR QL AUTO: ABNORMAL /HPF
BASOPHILS # BLD AUTO: 0.02 10*3/MM3 (ref 0–0.2)
BASOPHILS NFR BLD AUTO: 0.2 % (ref 0–1.5)
BILIRUB SERPL-MCNC: 0.4 MG/DL (ref 0–1.2)
BILIRUB UR QL STRIP: NEGATIVE
BUN SERPL-MCNC: 4 MG/DL (ref 6–20)
BUN/CREAT SERPL: 7.7 (ref 7–25)
CALCIUM SPEC-SCNC: 9.6 MG/DL (ref 8.6–10.5)
CHLORIDE SERPL-SCNC: 100 MMOL/L (ref 98–107)
CLARITY UR: ABNORMAL
CO2 SERPL-SCNC: 23.3 MMOL/L (ref 22–29)
COLOR UR: YELLOW
CREAT SERPL-MCNC: 0.52 MG/DL (ref 0.57–1)
CYTOLOGIST CVX/VAG CYTO: NORMAL
CYTOLOGY CVX/VAG DOC CYTO: NORMAL
DEPRECATED RDW RBC AUTO: 41 FL (ref 37–54)
DX ICD CODE: NORMAL
EGFRCR SERPLBLD CKD-EPI 2021: 135.8 ML/MIN/1.73
EOSINOPHIL # BLD AUTO: 0.17 10*3/MM3 (ref 0–0.4)
EOSINOPHIL NFR BLD AUTO: 1.7 % (ref 0.3–6.2)
ERYTHROCYTE [DISTWIDTH] IN BLOOD BY AUTOMATED COUNT: 12.4 % (ref 12.3–15.4)
GLOBULIN UR ELPH-MCNC: 3.2 GM/DL
GLUCOSE SERPL-MCNC: 98 MG/DL (ref 65–99)
GLUCOSE UR STRIP-MCNC: NEGATIVE MG/DL
HCG INTACT+B SERPL-ACNC: NORMAL MIU/ML
HCT VFR BLD AUTO: 40 % (ref 34–46.6)
HGB BLD-MCNC: 13.3 G/DL (ref 12–15.9)
HGB UR QL STRIP.AUTO: NEGATIVE
HIV 1 & 2 AB SER-IMP: NORMAL
HYALINE CASTS UR QL AUTO: ABNORMAL /LPF
IMM GRANULOCYTES # BLD AUTO: 0.02 10*3/MM3 (ref 0–0.05)
IMM GRANULOCYTES NFR BLD AUTO: 0.2 % (ref 0–0.5)
KETONES UR QL STRIP: ABNORMAL
LEUKOCYTE ESTERASE UR QL STRIP.AUTO: ABNORMAL
LYMPHOCYTES # BLD AUTO: 2.37 10*3/MM3 (ref 0.7–3.1)
LYMPHOCYTES NFR BLD AUTO: 23.7 % (ref 19.6–45.3)
Lab: NORMAL
MCH RBC QN AUTO: 29.8 PG (ref 26.6–33)
MCHC RBC AUTO-ENTMCNC: 33.3 G/DL (ref 31.5–35.7)
MCV RBC AUTO: 89.7 FL (ref 79–97)
MONOCYTES # BLD AUTO: 0.66 10*3/MM3 (ref 0.1–0.9)
MONOCYTES NFR BLD AUTO: 6.6 % (ref 5–12)
NEUTROPHILS NFR BLD AUTO: 6.77 10*3/MM3 (ref 1.7–7)
NEUTROPHILS NFR BLD AUTO: 67.6 % (ref 42.7–76)
NITRITE UR QL STRIP: NEGATIVE
NRBC BLD AUTO-RTO: 0 /100 WBC (ref 0–0.2)
OTHER STN SPEC: NORMAL
PH UR STRIP.AUTO: 7 [PH] (ref 5–8)
PLATELET # BLD AUTO: 326 10*3/MM3 (ref 140–450)
PMV BLD AUTO: 9.6 FL (ref 6–12)
POTASSIUM SERPL-SCNC: 3.2 MMOL/L (ref 3.5–5.2)
PROT SERPL-MCNC: 7.7 G/DL (ref 6–8.5)
PROT UR QL STRIP: NEGATIVE
RBC # BLD AUTO: 4.46 10*6/MM3 (ref 3.77–5.28)
RBC # UR STRIP: ABNORMAL /HPF
REF LAB TEST METHOD: ABNORMAL
SODIUM SERPL-SCNC: 138 MMOL/L (ref 136–145)
SP GR UR STRIP: 1.02 (ref 1–1.03)
SQUAMOUS #/AREA URNS HPF: ABNORMAL /HPF
STAT OF ADQ CVX/VAG CYTO-IMP: NORMAL
UROBILINOGEN UR QL STRIP: ABNORMAL
WBC # UR STRIP: ABNORMAL /HPF
WBC NRBC COR # BLD AUTO: 10.01 10*3/MM3 (ref 3.4–10.8)

## 2024-02-08 PROCEDURE — 80053 COMPREHEN METABOLIC PANEL: CPT

## 2024-02-08 PROCEDURE — 84702 CHORIONIC GONADOTROPIN TEST: CPT

## 2024-02-08 PROCEDURE — 81001 URINALYSIS AUTO W/SCOPE: CPT

## 2024-02-08 PROCEDURE — 87086 URINE CULTURE/COLONY COUNT: CPT

## 2024-02-08 PROCEDURE — 25810000003 SODIUM CHLORIDE 0.9 % SOLUTION

## 2024-02-08 PROCEDURE — 85025 COMPLETE CBC W/AUTO DIFF WBC: CPT

## 2024-02-08 PROCEDURE — 96374 THER/PROPH/DIAG INJ IV PUSH: CPT

## 2024-02-08 PROCEDURE — 25010000002 ONDANSETRON PER 1 MG

## 2024-02-08 PROCEDURE — 99283 EMERGENCY DEPT VISIT LOW MDM: CPT

## 2024-02-08 RX ORDER — ONDANSETRON 2 MG/ML
4 INJECTION INTRAMUSCULAR; INTRAVENOUS ONCE
Status: COMPLETED | OUTPATIENT
Start: 2024-02-08 | End: 2024-02-08

## 2024-02-08 RX ORDER — CEPHALEXIN 500 MG/1
500 CAPSULE ORAL 2 TIMES DAILY
Qty: 14 CAPSULE | Refills: 0 | Status: SHIPPED | OUTPATIENT
Start: 2024-02-08 | End: 2024-02-15

## 2024-02-08 RX ADMIN — ONDANSETRON 4 MG: 2 INJECTION INTRAMUSCULAR; INTRAVENOUS at 17:45

## 2024-02-08 RX ADMIN — SODIUM CHLORIDE 1000 ML: 9 INJECTION, SOLUTION INTRAVENOUS at 17:45

## 2024-02-08 NOTE — ED PROVIDER NOTES
Time: 5:10 PM EST  Date of encounter:  2024  Independent Historian/Clinical History and Information was obtained by:   Patient and Family    History is limited by: N/A    Chief Complaint   Patient presents with    Vomiting    Nausea         History of Present Illness:  Patient is a 23 y.o. year old female who presents to the emergency department for evaluation of vomiting 11 weeks pregnant.  Denies Vaginal bleeding. (Provider in triage)    Patient presents to the emergency department today for evaluation of vomiting.  Patient states she is approximately 11 weeks gestation at this time.  Patient is  with last menstrual period being on 2023.  Patient states that she has been evaluated by OB/GYN and they recently changed her nausea medication from Zofran to Phenergan 1 week ago.  Patient states she was told by her OB if she continued to have significant vomiting that she needed to come to the emergency department to receive fluids.  Patient states today she had felt more weak than usual and decided that she felt that she would need fluids at this time.  Patient states she is lost approximately 13 pounds due to the nausea and vomiting.  Patient is denying any abdominal pain or vaginal bleeding.  No other complaints.    Patient Care Team  Primary Care Provider: Snellen, Danielle, APRN    Past Medical History:     No Known Allergies  Past Medical History:   Diagnosis Date    Anxiety     Kidney stone     several since a teen    Migraine withOUT aura     Ureterolithiasis      Past Surgical History:   Procedure Laterality Date    KIDNEY STONE SURGERY      stent, w removal, lithotripsy     Family History   Problem Relation Age of Onset    Breast cancer Maternal Aunt         Mat. Great Great Aunt    Colon cancer Maternal Aunt         Mat. Great Great Aunt    Kidney cancer Maternal Uncle         Mat. Great Great Uncle    Lung cancer Maternal Uncle         Mat Great Great Uncle    Breast cancer Maternal Cousin      "    3rd Cousin    Thyroid disease Neg Hx     Ovarian cancer Neg Hx     Uterine cancer Neg Hx     Pulmonary embolism Neg Hx     Deep vein thrombosis Neg Hx        Home Medications:  Prior to Admission medications    Medication Sig Start Date End Date Taking? Authorizing Provider   doxylamine (UNISOM) 25 MG tablet Take 1 tablet by mouth Daily As Needed for Sleep or Nausea (or anxiety). May take and extra 1/2 tablet PRN persistent nausea or anxiety 1/9/24   Jeanine Garcia DO   ondansetron (ZOFRAN) 4 MG tablet Take 1 tablet by mouth Every 8 (Eight) Hours As Needed for Nausea or Vomiting. 1/9/24   Jeanine Garcia DO   Prenatal Vit-Fe Fum-FA-Omega (Prenatal Multi +DHA) 27-0.8-228 MG capsule Take 1 capsule by mouth Daily. 1/9/24   Provider, MD Yulia   promethazine (PHENERGAN) 25 MG tablet Take 1 tablet by mouth Every 6 (Six) Hours As Needed for Nausea or Vomiting. 2/5/24   Jeanine Garcia DO   sertraline (ZOLOFT) 25 MG tablet Take 1 tablet by mouth Daily. 2/5/24   Jeanine Garcia DO   vitamin B-6 (PYRIDOXINE) 25 MG tablet Take 1 tablet by mouth 2 (Two) Times a Day As Needed (Nausea). 1/9/24   Jeanine Garcia DO        Social History:   Social History     Tobacco Use    Smoking status: Never    Smokeless tobacco: Never   Vaping Use    Vaping Use: Never used   Substance Use Topics    Alcohol use: Never    Drug use: Never         Review of Systems:  Review of Systems   Constitutional:  Negative for fever.   Gastrointestinal:  Positive for nausea and vomiting. Negative for abdominal pain and diarrhea.   Genitourinary:  Negative for dysuria and vaginal bleeding.   All other systems reviewed and are negative.       Physical Exam:  /87 (BP Location: Right arm, Patient Position: Sitting)   Pulse 104   Temp 98 °F (36.7 °C) (Oral)   Resp 18   Ht 167.6 cm (66\")   Wt 53.1 kg (117 lb 1 oz)   LMP 11/22/2023   SpO2 100%   BMI 18.89 kg/m²         Physical Exam  Vitals and nursing note reviewed.   Constitutional:       Appearance: Normal " appearance.   HENT:      Head: Normocephalic and atraumatic.      Nose: Nose normal.      Mouth/Throat:      Mouth: Mucous membranes are moist.   Eyes:      Extraocular Movements: Extraocular movements intact.      Conjunctiva/sclera: Conjunctivae normal.      Pupils: Pupils are equal, round, and reactive to light.   Cardiovascular:      Rate and Rhythm: Normal rate and regular rhythm.      Heart sounds: Normal heart sounds.   Pulmonary:      Effort: Pulmonary effort is normal.      Breath sounds: Normal breath sounds.   Abdominal:      General: Abdomen is flat. Bowel sounds are normal. There is no distension.      Palpations: Abdomen is soft. There is no mass.      Tenderness: There is no abdominal tenderness. There is no guarding or rebound.      Hernia: No hernia is present.   Musculoskeletal:         General: Normal range of motion.      Cervical back: Normal range of motion and neck supple.   Skin:     General: Skin is warm and dry.   Neurological:      General: No focal deficit present.      Mental Status: She is alert and oriented to person, place, and time.   Psychiatric:         Mood and Affect: Mood normal.         Behavior: Behavior normal.         Thought Content: Thought content normal.         Judgment: Judgment normal.                    Procedures:  Procedures      Medical Decision Making:    Comorbidities that affect care:    None    External Notes reviewed:    Previous Clinic Note: OB office visit from 1-9-2024      The following orders were placed and all results were independently analyzed by me:  Orders Placed This Encounter   Procedures    Urine Culture - Urine,    Urinalysis With Microscopic If Indicated (No Culture) - Urine, Clean Catch    hCG, Quantitative, Pregnancy    Comprehensive Metabolic Panel    CBC Auto Differential    Urinalysis, Microscopic Only - Urine, Clean Catch    CBC & Differential       Medications Given in the Emergency Department:  Medications   sodium chloride 0.9 % bolus  1,000 mL (1,000 mL Intravenous New Bag 2/8/24 1745)   ondansetron (ZOFRAN) injection 4 mg (4 mg Intravenous Given 2/8/24 1745)        ED Course:    The patient was initially evaluated in the triage area where orders were placed. The patient was later dispositioned by Nikhil Sanches PA-C.      The patient was advised to stay for completion of workup which includes but is not limited to communication of labs and radiological results, reassessment and plan. The patient was advised that leaving prior to disposition by a provider could result in critical findings that are not communicated to the patient.     ED Course as of 02/08/24 1846   Thu Feb 08, 2024 1711 --- PROVIDER IN TRIAGE NOTE ---    The patient was evaluated by Jimi crook in triage. Orders were placed and the patient is currently awaiting disposition.    [AJ]      ED Course User Index  [AJ] Jimi Golden PA-C       Labs:    Lab Results (last 24 hours)       Procedure Component Value Units Date/Time    hCG, Quantitative, Pregnancy [[id number]] Collected: 02/08/24 1733    Specimen: Blood Updated: 02/08/24 1820     HCG Quantitative 92,321.00 mIU/mL     Narrative:      HCG Ranges by Gestational Age    Females - non-pregnant premenopausal   </= 1mIU/mL HCG  Females - postmenopausal               </= 7mIU/mL HCG    3 Weeks       5.4   -      72 mIU/mL  4 Weeks      10.2   -     708 mIU/mL  5 Weeks       217   -   8,245 mIU/mL  6 Weeks       152   -  32,177 mIU/mL  7 Weeks     4,059   - 153,767 mIU/mL  8 Weeks    31,366   - 149,094 mIU/mL  9 Weeks    59,109   - 135,901 mIU/mL  10 Weeks   44,186   - 170,409 mIU/mL  12 Weeks   27,107   - 201,615 mIU/mL  14 Weeks   24,302   -  93,646 mIU/mL  15 Weeks   12,540   -  69,747 mIU/mL  16 Weeks    8,904   -  55,332 mIU/mL  17 Weeks    8,240   -  51,793 mIU/mL  18 Weeks    9,649   -  55,271 mIU/mL      CBC & Differential [[id number]]  (Normal) Collected: 02/08/24 1733    Specimen: Blood Updated: 02/08/24  [id number]    Narrative:      The following orders were created for panel order CBC & Differential.  Procedure                               Abnormality         Status                     ---------                               -----------         ------                     CBC Auto Differential[[id number]]        Normal              Final result                 Please view results for these tests on the individual orders.    Comprehensive Metabolic Panel [[id number]]  (Abnormal) Collected: 02/08/24 1733    Specimen: Blood Updated: 02/08/24 1810     Glucose 98 mg/dL      BUN 4 mg/dL      Creatinine 0.52 mg/dL      Sodium 138 mmol/L      Potassium 3.2 mmol/L      Chloride 100 mmol/L      CO2 23.3 mmol/L      Calcium 9.6 mg/dL      Total Protein 7.7 g/dL      Albumin 4.5 g/dL      ALT (SGPT) 10 U/L      AST (SGOT) 17 U/L      Alkaline Phosphatase 60 U/L      Total Bilirubin 0.4 mg/dL      Globulin 3.2 gm/dL      A/G Ratio 1.4 g/dL      BUN/Creatinine Ratio 7.7     Anion Gap 14.7 mmol/L      eGFR 135.8 mL/min/1.73     Narrative:      GFR Normal >60  Chronic Kidney Disease <60  Kidney Failure <15      CBC Auto Differential [[id number]]  (Normal) Collected: 02/08/24 1733    Specimen: Blood Updated: 02/08/24 1744     WBC 10.01 10*3/mm3      RBC 4.46 10*6/mm3      Hemoglobin 13.3 g/dL      Hematocrit 40.0 %      MCV 89.7 fL      MCH 29.8 pg      MCHC 33.3 g/dL      RDW 12.4 %      RDW-SD 41.0 fl      MPV 9.6 fL      Platelets 326 10*3/mm3      Neutrophil % 67.6 %      Lymphocyte % 23.7 %      Monocyte % 6.6 %      Eosinophil % 1.7 %      Basophil % 0.2 %      Immature Grans % 0.2 %      Neutrophils, Absolute 6.77 10*3/mm3      Lymphocytes, Absolute 2.37 10*3/mm3      Monocytes, Absolute 0.66 10*3/mm3      Eosinophils, Absolute 0.17 10*3/mm3      Basophils, Absolute 0.02 10*3/mm3      Immature Grans, Absolute 0.02 10*3/mm3      nRBC 0.0 /100 WBC     Urinalysis With Microscopic If Indicated (No Culture) - Urine, Clean Catch  [[id number]]  (Abnormal) Collected: 02/08/24 1734    Specimen: Urine, Clean Catch Updated: 02/08/24 1754     Color, UA Yellow     Appearance, UA Cloudy     pH, UA 7.0     Specific Gravity, UA 1.020     Glucose, UA Negative     Ketones, UA Trace     Bilirubin, UA Negative     Blood, UA Negative     Protein, UA Negative     Leuk Esterase, UA Moderate (2+)     Nitrite, UA Negative     Urobilinogen, UA 4.0 E.U./dL    Urinalysis, Microscopic Only - Urine, Clean Catch [[id number]]  (Abnormal) Collected: 02/08/24 1734    Specimen: Urine, Clean Catch Updated: 02/08/24 1754     RBC, UA 6-10 /HPF      WBC, UA 11-20 /HPF      Bacteria, UA None Seen /HPF      Squamous Epithelial Cells, UA 13-20 /HPF      Hyaline Casts, UA 3-6 /LPF      Methodology Automated Microscopy    Urine Culture - Urine, Urine, Clean Catch [[id number]] Collected: 02/08/24 1734    Specimen: Urine, Clean Catch Updated: 02/08/24 1830             Imaging:    No Radiology Exams Resulted Within Past 24 Hours      Differential Diagnosis and Discussion:      Vomiting: Differential diagnosis includes but is not limited to migraine, labyrinthine disorders, psychogenic, metabolic and endocrine causes, peptic ulcer, gastric outlet obstruction, gastritis, gastroenteritis, appendicitis, intestinal obstruction, paralytic ileus, food poisoning, cholecystitis, acute hepatitis, acute pancreatitis, acute febrile illness, and myocardial infarction.    All labs were reviewed and interpreted by me.    MDM  Number of Diagnoses or Management Options  Diagnosis management comments: Patient presented to the emergency department today for evaluation of nausea vomiting pregnancy.  CBC is unremarkable.  CMP does note mildly decreased BUN/creatinine and potassium of 3.2.  hCG is appropriate for patient gestation.  Urinalysis is contaminated with 1320 squamous cells however there is moderate leukocytes and 11-20 white blood cells with microscopic hematuria so I will go ahead and begin  patient on antibiotics due to her current pregnancy.  Urine culture was sent.  I will have patient continue at home nausea medications.       Amount and/or Complexity of Data Reviewed  Clinical lab tests: reviewed and ordered    Risk of Complications, Morbidity, and/or Mortality  Presenting problems: moderate  Diagnostic procedures: low  Management options: low    Patient Progress  Patient progress: stable      Patient Care Considerations:    I considered ordering transvaginal ultrasound however patient is not having abdominal pain or vaginal bleeding      Consultants/Shared Management Plan:    None    Social Determinants of Health:    Patient is independent, reliable, and has access to care.       Disposition and Care Coordination:    Discharged: The patient is suitable and stable for discharge with no need for consideration of admission.    I have explained the patient´s condition, diagnoses and treatment plan based on the information available to me at this time. I have answered questions and addressed any concerns. The patient has a good  understanding of the patient´s diagnosis, condition, and treatment plan as can be expected at this point. The vital signs have been stable. The patient´s condition is stable and appropriate for discharge from the emergency department.      The patient will pursue further outpatient evaluation with the primary care physician or other designated or consulting physician as outlined in the discharge instructions. They are agreeable to this plan of care and follow-up instructions have been explained in detail. The patient has received these instructions in written format and has expressed an understanding of the discharge instructions. The patient is aware that any significant change in condition or worsening of symptoms should prompt an immediate return to this or the closest emergency department or call to 911.  I have explained discharge medications and the need for follow up  with the patient/caretakers. This was also printed in the discharge instructions. Patient was discharged with the following medications and follow up:      Medication List        New Prescriptions      cephalexin 500 MG capsule  Commonly known as: KEFLEX  Take 1 capsule by mouth 2 (Two) Times a Day for 7 days.               Where to Get Your Medications        These medications were sent to [id number] - [place], [place] - [address] AT [facility] - [phone] [facility] [phone]   [address] [place] [address]      Phone: [phone]   cephalexin 500 MG capsule      Snellen, Danielle, APRN  [address] [address] [phone]             Final diagnoses:   Nausea and vomiting in pregnancy   Acute UTI        ED Disposition       ED Disposition   Discharge    Condition   Stable    Comment   --               This medical record created using voice recognition software.             Nikhil Sanches PA-C  02/08/24 5408

## 2024-02-08 NOTE — DISCHARGE INSTRUCTIONS
Please take the full course of antibiotics as prescribed for treatment of your UTI.  Continue your at home medications for nausea and vomiting as prescribed to you by your OB/GYN.  Return for any worsening symptoms concerning to you.

## 2024-02-09 LAB — BACTERIA SPEC AEROBE CULT: NO GROWTH

## 2024-02-23 NOTE — PROGRESS NOTES
OB FOLLOW UP      Chief Complaint   Patient presents with    Routine Prenatal Visit     Subjective:   No complaints  Passed kidney stone, pain is now gone .  Has referral to Uro.  Long hx of kidney stones.   HA coming more, hx of     Objective:  /83   Wt 57.6 kg (127 lb)   LMP 11/22/2023   BMI 24.80 kg/m²  -0.454 kg (-1 lb) Facility age limit for growth %vernea is 20 years.  See OB flow sheet for fundal height (not performed if US day of OV), FHT, edema, cvx exam if performed, and Upro/Uglu  Chaperone present during pelvic exam if performed.      Assessment and Plan:  14w5d     Diagnoses and all orders for this visit:    1. Supervision of other normal pregnancy, antepartum (Primary)  Overview:  JEANINE finalized: 8/28/24 by LMP and 6week US    Genetic testing (NIPS-Quad)/CF/AFP:  CF/SMA neg.  NIPS neg XX.  Desires AFP- plan next OV    COVID: Recommended, declines  Flu: Recommended, declines  Tdap:    28-32 weeks repeat TPA:  ? Desires Sterilization:    Anatomy US:  FU US:    PROBLEM LIST/PLAN:   Anxiety- no meds.  Prepreg Zoloft.    Stable 3/4/2024   Hx of kidney stones- stay hydrated, avoid TUMS.  3/4/2024 await Uro consult.   FOB brother w bleed do and kids w autism- 3/4/2024 FOB tested and neg for bleeding disorder    Rubella NI- vaccinate PP      Orders:  -     POC Urinalysis Dipstick  -     US Ob Detail Fetal Anatomy Single or First Gestation; Future      Counseling:    Second trimester precautions  HEADACHES in pregnancy- We discussed safe options including Tylenol and caffeine, regular sleeping patterns, exercise, hydration, avoid any known triggers.  Option add phenergan or reglan to tylenol.  Recommend avoid Fioricet due to ROBBY risk/addictive potential.  Preventative options B blocker, procardia, or SSRIs.     Reassuring pregnancy progress. Questions answered  Continue PNV.  Importance of healthy eating, obtaining sufficient sleep, and staying active unless hypertensive- activity modified as  directed.    Return in about 4 weeks (around 4/1/2024) for FU OB, then OV 2weeks later anatomy US.            Jeanine Garcia, DO  03/04/2024    [facility]  [address] [place] [address]  Dept: [phone]  Dept Fax: [phone]  Loc: [phone]  Loc Fax: [phone]

## 2024-02-27 DIAGNOSIS — O21.9 NAUSEA AND VOMITING DURING PREGNANCY: ICD-10-CM

## 2024-02-27 RX ORDER — ONDANSETRON 4 MG/1
4 TABLET, FILM COATED ORAL EVERY 8 HOURS PRN
Qty: 30 TABLET | Refills: 1 | OUTPATIENT
Start: 2024-02-27

## 2024-02-29 ENCOUNTER — OFFICE VISIT (OUTPATIENT)
Dept: FAMILY MEDICINE CLINIC | Facility: CLINIC | Age: 24
End: 2024-02-29
Payer: COMMERCIAL

## 2024-02-29 VITALS
TEMPERATURE: 97.8 F | WEIGHT: 128.9 LBS | RESPIRATION RATE: 20 BRPM | SYSTOLIC BLOOD PRESSURE: 105 MMHG | DIASTOLIC BLOOD PRESSURE: 62 MMHG | HEIGHT: 60 IN | HEART RATE: 85 BPM | OXYGEN SATURATION: 98 % | BODY MASS INDEX: 25.31 KG/M2

## 2024-02-29 DIAGNOSIS — F41.9 ANXIETY: ICD-10-CM

## 2024-02-29 DIAGNOSIS — Z00.00 ANNUAL PHYSICAL EXAM: ICD-10-CM

## 2024-02-29 DIAGNOSIS — Z76.89 ESTABLISHING CARE WITH NEW DOCTOR, ENCOUNTER FOR: Primary | ICD-10-CM

## 2024-02-29 DIAGNOSIS — N20.0 NEPHROLITHIASIS: ICD-10-CM

## 2024-02-29 NOTE — PROGRESS NOTES
Fidel NEWTON presents to [facility] with complaints of frequent UTI/kidney stone, and is also here to establish as a new patient.      History of Present Illness  This is a 23-year-old female, past medical history significant for anxiety, kidney stones, frequent UTI, who presents to the clinic with complaints of frequent UTIs/kidney stones and is also here to establish as a new patient.    Anxiety: Patient states that she is doing pretty well, is currently on Zoloft 25 mg, and states that she is tolerating the medication well.  Does feel like it is controlling her anxiety as well, not having any acute issues.  No other symptoms noted.    Kidney stones: Patient states that she is actually had kidney stones and she was about 16 years old, states that she would have them sparingly as a child, but as she has gotten older she is getting them more frequently.  Actually had one about 1 to 2 weeks ago, caused her quite a large amount of pain, and states they are getting more difficult to pass each time.  Also states that she thinks because of the kidney stones, she has had reoccurring UTIs, and would like to have this further evaluated via urology once again.  Is never had her stone tested, although she did save her kidney stone from her previous most recent stone that she just passed.  Denies any current symptoms.    Patient is also currently pregnant, she is around 14 weeks pregnant currently, doing pretty well.  Was really sick at the beginning, but patient states the nausea and vomiting has improved.  Did lose about 20 pounds originally, but then has gained about 8 back.  Does still have Zofran to use as needed, and following up with OB/GYN routinely.    Will hold off on HPV vaccine until patient no longer pregnant, refuses other vaccines, and otherwise is up-to-date on other preventative screenings.    Past Medical History:   Diagnosis Date    Anxiety     Kidney stone      "several since a teen    Migraine withOUT aura     Ureterolithiasis      Past Surgical History:    KIDNEY STONE SURGERY    stent, w removal, lithotripsy       Social History     Socioeconomic History    Marital status:    Tobacco Use    Smoking status: Never    Smokeless tobacco: Never   Vaping Use    Vaping Use: Never used   Substance and Sexual Activity    Alcohol use: Never    Drug use: Never    Sexual activity: Yes     Partners: Male       Family History   Problem Relation Age of Onset    Breast cancer Maternal Aunt         Mat. Great Great Aunt    Colon cancer Maternal Aunt         Mat. Great Great Aunt    Kidney cancer Maternal Uncle         Mat. Great Great Uncle    Lung cancer Maternal Uncle         Mat Great Great Uncle    Breast cancer Maternal Cousin         3rd Cousin    Thyroid disease Neg Hx     Ovarian cancer Neg Hx     Uterine cancer Neg Hx     Pulmonary embolism Neg Hx     Deep vein thrombosis Neg Hx          Objective   Vital Signs:   /62   Pulse 85   Temp 97.8 °F (36.6 °C)   Resp 20   Ht 152.4 cm (60\")   Wt 58.5 kg (128 lb 14.4 oz)   SpO2 98%   BMI 25.17 kg/m²     Body mass index is 25.17 kg/m².    All labs, imaging, test results, and specialty provider notes reviewed with patient.       Physical Exam  Vitals reviewed.   Constitutional:       Appearance: Normal appearance.   Cardiovascular:      Rate and Rhythm: Normal rate and regular rhythm.      Pulses: Normal pulses.      Heart sounds: Normal heart sounds.   Pulmonary:      Effort: Pulmonary effort is normal.      Breath sounds: Normal breath sounds.   Neurological:      General: No focal deficit present.      Mental Status: She is alert and oriented to person, place, and time.                  BMI is >= 25 and <30. (Overweight) The following options were offered after discussion;: exercise counseling/recommendations and nutrition counseling/recommendations            Assessment and Plan:  Diagnoses and all orders for this " visit:    1. Establishing care with new doctor, encounter for (Primary)    2. Annual physical exam    3. Anxiety  Comments:  Well-controlled on Zoloft, continue.    4. Nephrolithiasis  Comments:  Refer to urology.  Orders:  -     Ambulatory Referral to Urology      Anticipatory Guidelines discussed with patient.    Discussed getting adequate exercise, reduced TV/electronic time, car safety including seat belt use, sexual activity (if applicable), and smoking/alcohol use (if applicable).    Follow Up:  Return for Annual physical.    Patient was given instructions and counseling regarding her condition or for health maintenance advice. Please see specific information pulled into the AVS if appropriate.

## 2024-03-04 ENCOUNTER — ROUTINE PRENATAL (OUTPATIENT)
Dept: OBSTETRICS AND GYNECOLOGY | Facility: CLINIC | Age: 24
End: 2024-03-04
Payer: COMMERCIAL

## 2024-03-04 VITALS — BODY MASS INDEX: 24.8 KG/M2 | DIASTOLIC BLOOD PRESSURE: 83 MMHG | WEIGHT: 127 LBS | SYSTOLIC BLOOD PRESSURE: 123 MMHG

## 2024-03-04 DIAGNOSIS — Z34.80 SUPERVISION OF OTHER NORMAL PREGNANCY, ANTEPARTUM: Primary | ICD-10-CM

## 2024-03-04 LAB
GLUCOSE UR STRIP-MCNC: NEGATIVE MG/DL
PROT UR STRIP-MCNC: NEGATIVE MG/DL

## 2024-03-04 PROCEDURE — 0502F SUBSEQUENT PRENATAL CARE: CPT | Performed by: OBSTETRICS & GYNECOLOGY

## 2024-03-19 ENCOUNTER — TELEPHONE (OUTPATIENT)
Dept: OBSTETRICS AND GYNECOLOGY | Facility: CLINIC | Age: 24
End: 2024-03-19
Payer: COMMERCIAL

## 2024-03-19 NOTE — TELEPHONE ENCOUNTER
Caller: Fidel NEWTON    Relationship: Self    Best call back number: [phone]      Who are you requesting to speak with (clinical staff, DR. MORROW      What was the call regarding: PATIENT ADVISED THAT SHE HAS A POSSIBLE KIDNEY STONE, PAIN IS BEARABLE, WOULD LIKE TO KNOW IF SHE SHOULD BE SEEN

## 2024-03-19 NOTE — TELEPHONE ENCOUNTER
Advised appointment here not needed. She was advised in continue with an increased fluid intake and if she were to notice  or nor urine output to go to the ER fore evaluation. Patient voiced understanding. She does have an appointment with urology in May.

## 2024-03-20 ENCOUNTER — TELEPHONE (OUTPATIENT)
Dept: FAMILY MEDICINE CLINIC | Facility: CLINIC | Age: 24
End: 2024-03-20
Payer: COMMERCIAL

## 2024-03-20 DIAGNOSIS — Z01.89 ROUTINE LAB DRAW: Primary | ICD-10-CM

## 2024-03-20 NOTE — TELEPHONE ENCOUNTER
Patient has another kidney stone. Patient is wanting to know if it could possibly be a result from her thyroid not working properly.

## 2024-04-01 ENCOUNTER — ROUTINE PRENATAL (OUTPATIENT)
Dept: OBSTETRICS AND GYNECOLOGY | Facility: CLINIC | Age: 24
End: 2024-04-01
Payer: COMMERCIAL

## 2024-04-01 VITALS — DIASTOLIC BLOOD PRESSURE: 72 MMHG | WEIGHT: 132 LBS | BODY MASS INDEX: 25.78 KG/M2 | SYSTOLIC BLOOD PRESSURE: 115 MMHG

## 2024-04-01 DIAGNOSIS — Z34.80 SUPERVISION OF OTHER NORMAL PREGNANCY, ANTEPARTUM: Primary | ICD-10-CM

## 2024-04-01 DIAGNOSIS — O09.899 RUBELLA NON-IMMUNE STATUS, ANTEPARTUM: ICD-10-CM

## 2024-04-01 DIAGNOSIS — Z28.39 RUBELLA NON-IMMUNE STATUS, ANTEPARTUM: ICD-10-CM

## 2024-04-01 DIAGNOSIS — R30.0 DYSURIA: ICD-10-CM

## 2024-04-01 LAB
GLUCOSE UR STRIP-MCNC: NEGATIVE MG/DL
PROT UR STRIP-MCNC: NEGATIVE MG/DL

## 2024-04-01 PROCEDURE — 87086 URINE CULTURE/COLONY COUNT: CPT | Performed by: OBSTETRICS & GYNECOLOGY

## 2024-04-01 PROCEDURE — 82105 ALPHA-FETOPROTEIN SERUM: CPT | Performed by: OBSTETRICS & GYNECOLOGY

## 2024-04-01 PROCEDURE — 0502F SUBSEQUENT PRENATAL CARE: CPT | Performed by: OBSTETRICS & GYNECOLOGY

## 2024-04-01 PROCEDURE — 36415 COLL VENOUS BLD VENIPUNCTURE: CPT | Performed by: OBSTETRICS & GYNECOLOGY

## 2024-04-01 NOTE — PATIENT INSTRUCTIONS
Venipuncture Blood Specimen Collection  Venipuncture performed in left arm by Melinda Sierra with good hemostasis. Patient tolerated the procedure well without complications.   04/01/24   Melinda Sierra

## 2024-04-01 NOTE — PROGRESS NOTES
OB Follow Up    CC: Routine obstetrical visit    Prenatal care complicated by:  Patient Active Problem List   Diagnosis    Other cerebral palsy    Anxiety    Supervision of other normal pregnancy, antepartum    Rubella non-immune status, antepartum     Subjective:   Fidel NEWTON is a 23 y.o.  18w5d patient being seen today for her obstetrical follow up visit. The patient has: No complaints, No leaking fluid, No vaginal bleeding, No contractions, Adequate FM  Complains of some dysuria since yesterday.  Reports she gets UTIs frequently    History: Past medical and surgical history, medications, allergies, social history, and obstetrical history all reviewed and updated.    Objective:    Urine glucose/protein - See OB flow sheet      /72   Wt 59.9 kg (132 lb)   LMP 2023   BMI 25.78 kg/m²     General exam: Comfortable, NAD  FHR: 155 BPM   Uterine Size: size equals dates  Pelvic Exam: No    Assessment and Plan:  Diagnoses and all orders for this visit:    1. Supervision of other normal pregnancy, antepartum (Primary)  Overview:  JEANINE finalized: 24 by LMP and 6week US    Genetic testing (NIPS-Quad)/CF/AFP:  CF/SMA neg.  NIPS neg XX.  Desires AFP- plan next OV    COVID: Recommended, declines  Flu: Recommended, declines  Tdap:    Assessment & Plan:  Continue prenatal vitamin  Anatomy ultrasound next office visit  AFP today    Orders:  -     Alpha Fetoprotein, Maternal  -     POC Urinalysis Dipstick    2. Rubella non-immune status, antepartum    3. Dysuria  -     Urine Culture - Urine, Urine, Clean Catch      18w5d  Reassuring pregnancy progress    Counseling: Second trimester precautions  [facility]    Questions answered    Return in about 4 weeks (around 2024) for Next scheduled follow up.    Anshul Echeverria MD  2024

## 2024-04-02 LAB — BACTERIA SPEC AEROBE CULT: ABNORMAL

## 2024-04-05 LAB
AFP INTERP SERPL-IMP: NORMAL
AFP INTERP SERPL-IMP: NORMAL
AFP MOM SERPL: 0.94
AFP SERPL-MCNC: 49.7 NG/ML
AGE AT DELIVERY: 21.7 YR
GA METHOD: NORMAL
GA: 18.7 WEEKS
IDDM PATIENT QL: NO
LABORATORY COMMENT REPORT: NORMAL
MULTIPLE PREGNANCY: NO
NEURAL TUBE DEFECT RISK FETUS: NORMAL %
RESULT: NORMAL

## 2024-04-11 NOTE — PROGRESS NOTES
OB FOLLOW UP      Chief Complaint   Patient presents with    Routine Prenatal Visit     Subjective:   No complaints, zoloft 25mg doing really well, has questions re safety in preg and at delivery    Objective:  /80   Wt 61.1 kg (134 lb 12.8 oz)   LMP 11/22/2023   BMI 26.33 kg/m²  3.084 kg (6 lb 12.8 oz) Facility age limit for growth %verena is 20 years.  See OB flow sheet for fundal height (not performed if US day of OV), FHT, edema, cvx exam if performed, and Upro/Uglu  Chaperone present during pelvic exam if performed.      Assessment and Plan:  20w5d     Diagnoses and all orders for this visit:    1. Supervision of other normal pregnancy, antepartum (Primary)  Overview:  JEANINE finalized: 8/28/24 by LMP and 6week US    CF/SMA neg.  NIPS neg XX.  AFP neg.    COVID: Recommended, declines  Flu: Recommended, declines  Tdap:    28-32 weeks repeat TPA:  ? Desires Sterilization:    Anatomy US: [facility] 4/15/24 consistent w dates, posterior placenta, breech, wnl and completed  FU US:    PROBLEM LIST/PLAN:   Anxiety- Zoloft 25mg  Stable   Hx of kidney stones- stay hydrated, avoid TUMS.  Uro manages.    FOB brother w bleed do and kids w autism- FOB tested and neg for bleeding disorder  Rubella NI- vaccinate PP    Orders:  -     POC Urinalysis Dipstick      Counseling:    Second trimester precautions  SSRI treatment reviewed, Reviewed low risk ROBBY at current dose.  Current guidelines recommend treatment of depression and/or anxiety in pregnancy.  Recommend lowest dose keeping symptoms under control.  Therapy recommended first line and does NOT need to wean prior to delivery.    Reassuring pregnancy progress. Questions answered.  Continue PNV.  Importance of healthy eating, obtaining sufficient sleep, and staying active unless hypertensive- activity modified as directed.    Return in about 4 weeks (around 5/13/2024) for FU OB w glucola, then OV 3weeks later.            Jeanine Garcia, DO  04/15/2024    [facility] DARIANA CHAVEZ  [facility]  [address] [place] [address]  Dept: [phone]  Dept Fax: [phone]  Loc: [phone]  Loc Fax: [phone]    Torito Clark(Attending)

## 2024-04-15 ENCOUNTER — ROUTINE PRENATAL (OUTPATIENT)
Dept: OBSTETRICS AND GYNECOLOGY | Facility: CLINIC | Age: 24
End: 2024-04-15
Payer: COMMERCIAL

## 2024-04-15 VITALS — WEIGHT: 134.8 LBS | BODY MASS INDEX: 26.33 KG/M2 | DIASTOLIC BLOOD PRESSURE: 80 MMHG | SYSTOLIC BLOOD PRESSURE: 135 MMHG

## 2024-04-15 DIAGNOSIS — Z34.80 SUPERVISION OF OTHER NORMAL PREGNANCY, ANTEPARTUM: Primary | ICD-10-CM

## 2024-04-15 LAB
GLUCOSE UR STRIP-MCNC: NEGATIVE MG/DL
PROT UR STRIP-MCNC: NEGATIVE MG/DL

## 2024-04-15 PROCEDURE — 0502F SUBSEQUENT PRENATAL CARE: CPT | Performed by: OBSTETRICS & GYNECOLOGY

## 2024-04-29 DIAGNOSIS — F41.9 ANXIETY: ICD-10-CM

## 2024-04-29 RX ORDER — SERTRALINE HYDROCHLORIDE 25 MG/1
25 TABLET, FILM COATED ORAL DAILY
Qty: 30 TABLET | Refills: 2 | Status: CANCELLED | OUTPATIENT
Start: 2024-04-29

## 2024-04-29 RX ORDER — SERTRALINE HYDROCHLORIDE 25 MG/1
25 TABLET, FILM COATED ORAL DAILY
Qty: 30 TABLET | Refills: 5 | Status: SHIPPED | OUTPATIENT
Start: 2024-04-29

## 2024-04-29 NOTE — TELEPHONE ENCOUNTER
Pharmacy requesting refill on Zoloft 25 mg.  Last seen 4/15/24.  Last filled Zoloft 25 mg # 30 with 2 refills.  Next appointment 5/13/24

## 2024-04-30 NOTE — TELEPHONE ENCOUNTER
Denied patient request for Zoloft.  Last filled 4/29/24 Zoloft # 30 with 5 refills.  Called patient informed Rx already sent to pharmacy

## 2024-05-09 NOTE — PROGRESS NOTES
OB FOLLOW UP      Chief Complaint   Patient presents with    Routine Prenatal Visit     Subjective:   GERD, tums help  LBP    Objective:  /85   Wt 64.6 kg (142 lb 6.4 oz)   LMP 11/22/2023   BMI 27.81 kg/m²  6.532 kg (14 lb 6.4 oz) Facility age limit for growth %verena is 20 years.  See OB flow sheet for fundal height (not performed if US day of OV), FHT, edema, cvx exam if performed, and Upro/Uglu  Chaperone present during pelvic exam if performed.      Assessment and Plan:  24w5d     Diagnoses and all orders for this visit:    1. Supervision of other normal pregnancy, antepartum (Primary)  Overview:  JEANINE finalized: 8/28/24 by LMP and 6week US    CF/SMA neg.  NIPS neg XX.  AFP neg.    COVID: Recommended, declines  Flu: Recommended, declines  Tdap: Recommended, s/p Rx 5/13/2024    28-32 weeks repeat TPA:  ? Desires Sterilization:  Declines 5/13/2024     Anatomy US: [facility] 4/15/24 consistent w dates, posterior placenta, breech, wnl and completed  FU US:    PROBLEM LIST/PLAN:   Anxiety- Zoloft 25mg  Stable 5/13/2024   Hx of kidney stones- stay hydrated, avoid TUMS.  Uro manages.    FOB brother w bleed do and kids w autism- FOB tested and neg for bleeding disorder  Rubella NI- vaccinate PP    Orders:  -     POC Urinalysis Dipstick  -     CBC (No Diff)  -     Gestational Diabetes Screen 1 Hour    2. Gastroesophageal reflux disease without esophagitis  -     famotidine (Pepcid) 20 MG tablet; Take 1 tablet by mouth 2 (Two) Times a Day As Needed for Heartburn.  Dispense: 60 tablet; Refill: 1      Counseling:    OB precautions, leaking, VB, tex nelson vs PTL/Labor  FKC  BACK PAIN discussed.  Recommend conservative measures heat, warm bath, pregnancy support belt, tylenol prn.  GERD in pregnancy discussed.  Recommend smaller meals, avoid lying down at least 2hrs after meals, and avoid foods that trigger it (commonly highly seasoned foods, pizza, italian, mexican etc).  OTC Tums safe.  If using them regularly recommend  other medication options that can be prescribed.       Reassuring pregnancy progress. Questions answered.  Continue PNV.  Importance of healthy eating, obtaining sufficient sleep, and staying active unless hypertensive- activity modified as directed.    Return in about 3 weeks (around 6/3/2024) for FU OB then q 2weeks x2.            Jeanine Garcia, DO  05/13/2024    [facility]  [address] [place] [address]  Dept: [phone]  Dept Fax: [phone]  Loc: [phone]  Loc Fax: [phone]

## 2024-05-13 ENCOUNTER — TELEPHONE (OUTPATIENT)
Dept: OBSTETRICS AND GYNECOLOGY | Facility: CLINIC | Age: 24
End: 2024-05-13
Payer: COMMERCIAL

## 2024-05-13 ENCOUNTER — ROUTINE PRENATAL (OUTPATIENT)
Dept: OBSTETRICS AND GYNECOLOGY | Facility: CLINIC | Age: 24
End: 2024-05-13
Payer: COMMERCIAL

## 2024-05-13 VITALS — BODY MASS INDEX: 27.81 KG/M2 | WEIGHT: 142.4 LBS | DIASTOLIC BLOOD PRESSURE: 85 MMHG | SYSTOLIC BLOOD PRESSURE: 123 MMHG

## 2024-05-13 DIAGNOSIS — O99.810 GLUCOSE INTOLERANCE OF PREGNANCY: Primary | ICD-10-CM

## 2024-05-13 DIAGNOSIS — K21.9 GASTROESOPHAGEAL REFLUX DISEASE WITHOUT ESOPHAGITIS: ICD-10-CM

## 2024-05-13 DIAGNOSIS — Z34.80 SUPERVISION OF OTHER NORMAL PREGNANCY, ANTEPARTUM: Primary | ICD-10-CM

## 2024-05-13 PROBLEM — R73.09 ELEVATED GLUCOSE TOLERANCE TEST: Status: ACTIVE | Noted: 2024-05-13

## 2024-05-13 PROBLEM — O99.019 MATERNAL ANEMIA IN PREGNANCY, ANTEPARTUM: Status: ACTIVE | Noted: 2024-05-13

## 2024-05-13 LAB
DEPRECATED RDW RBC AUTO: 41 FL (ref 37–54)
ERYTHROCYTE [DISTWIDTH] IN BLOOD BY AUTOMATED COUNT: 12.3 % (ref 12.3–15.4)
GLUCOSE 1H P GLC SERPL-MCNC: 141 MG/DL (ref 65–139)
GLUCOSE UR STRIP-MCNC: NEGATIVE MG/DL
HCT VFR BLD AUTO: 30.1 % (ref 34–46.6)
HGB BLD-MCNC: 10.1 G/DL (ref 12–15.9)
MCH RBC QN AUTO: 30.6 PG (ref 26.6–33)
MCHC RBC AUTO-ENTMCNC: 33.6 G/DL (ref 31.5–35.7)
MCV RBC AUTO: 91.2 FL (ref 79–97)
PLATELET # BLD AUTO: 271 10*3/MM3 (ref 140–450)
PMV BLD AUTO: 9.8 FL (ref 6–12)
PROT UR STRIP-MCNC: NEGATIVE MG/DL
RBC # BLD AUTO: 3.3 10*6/MM3 (ref 3.77–5.28)
WBC NRBC COR # BLD AUTO: 10.96 10*3/MM3 (ref 3.4–10.8)

## 2024-05-13 PROCEDURE — 82950 GLUCOSE TEST: CPT | Performed by: OBSTETRICS & GYNECOLOGY

## 2024-05-13 PROCEDURE — 85027 COMPLETE CBC AUTOMATED: CPT | Performed by: OBSTETRICS & GYNECOLOGY

## 2024-05-13 PROCEDURE — 0502F SUBSEQUENT PRENATAL CARE: CPT | Performed by: OBSTETRICS & GYNECOLOGY

## 2024-05-13 RX ORDER — FAMOTIDINE 20 MG/1
20 TABLET, FILM COATED ORAL 2 TIMES DAILY PRN
Qty: 60 TABLET | Refills: 1 | Status: SHIPPED | OUTPATIENT
Start: 2024-05-13

## 2024-05-16 ENCOUNTER — TELEPHONE (OUTPATIENT)
Dept: OBSTETRICS AND GYNECOLOGY | Facility: CLINIC | Age: 24
End: 2024-05-16
Payer: COMMERCIAL

## 2024-05-16 DIAGNOSIS — D50.9 IRON DEFICIENCY ANEMIA DURING PREGNANCY: Primary | ICD-10-CM

## 2024-05-16 DIAGNOSIS — O99.019 IRON DEFICIENCY ANEMIA DURING PREGNANCY: Primary | ICD-10-CM

## 2024-05-16 RX ORDER — FERROUS SULFATE 325(65) MG
325 TABLET ORAL EVERY OTHER DAY
Qty: 15 TABLET | Refills: 2 | Status: SHIPPED | OUTPATIENT
Start: 2024-05-16

## 2024-05-16 NOTE — TELEPHONE ENCOUNTER
"Note per Dr. Garcia: \"Anemia.  Please send in Rx for iron QOD (every OTHER day) per protocol to her pharmacy, #15, 2RF.  I recommend increasing meat in her diet (especially red meat), iron fortified cereals and cooking in an iron skillet.  I recommend avoiding foods that inhibit absorption of iron, eg tea, coffee, and whole grains like bran.\"    Patient contacted and informed. Script sent to [facility] in [place] per her request.   "

## 2024-05-18 PROBLEM — N20.0 NEPHROLITHIASIS: Status: ACTIVE | Noted: 2024-05-18

## 2024-05-18 NOTE — PROGRESS NOTES
Chief Complaint: Urologic complaint    Subjective         History of Present Illness  Fidel NEWTON is a 23 y.o. female     Nephrolithiasis  Recurrent UTI      26 weeks pregnant    past medical history significant for anxiety, kidney stones, frequent UTI    passed around 15 stones.  3 lithotripsies  Kidney stones ever since she was 16    6 antibiotic treatment for UTI in the last year.  Has trouble with constipation.    Passed a stone 3 months ago.    NO GH    2/24 0.5,     No previous meds for kidney stone prevention    No positive urine cultures in the past in our system    Nephrolithiasis in her family    No cardiopulmonary history  Non-smoker  No anticoagulation        Objective     Past Medical History:   Diagnosis Date    Anxiety     Kidney stone     several since a teen    Migraine withOUT aura     Ureterolithiasis        Past Surgical History:   Procedure Laterality Date    KIDNEY STONE SURGERY      stent, w removal, lithotripsy         Current Outpatient Medications:     famotidine (Pepcid) 20 MG tablet, Take 1 tablet by mouth 2 (Two) Times a Day As Needed for Heartburn., Disp: 60 tablet, Rfl: 1    ferrous sulfate 325 (65 FE) MG tablet, Take 1 tablet by mouth Every Other Day., Disp: 15 tablet, Rfl: 2    Prenatal Vit-Fe Fum-FA-Omega (Prenatal Multi +DHA) 27-0.8-228 MG capsule, Take 1 capsule by mouth Daily., Disp: , Rfl:     sertraline (ZOLOFT) 25 MG tablet, TAKE 1 TABLET BY MOUTH DAILY, Disp: 30 tablet, Rfl: 5    No Known Allergies     Family History   Problem Relation Age of Onset    Breast cancer Maternal Aunt         Mat. Great Great Aunt    Colon cancer Maternal Aunt         Mat. Great Great Aunt    Kidney cancer Maternal Uncle         Mat. Great Great Uncle    Lung cancer Maternal Uncle         Mat Great Great Uncle    Breast cancer Maternal Cousin         3rd Cousin    Thyroid disease Neg Hx     Ovarian cancer Neg Hx     Uterine cancer Neg Hx     Pulmonary embolism Neg Hx     Deep vein  thrombosis Neg Hx        Social History     Socioeconomic History    Marital status:    Tobacco Use    Smoking status: Never    Smokeless tobacco: Never   Vaping Use    Vaping status: Never Used   Substance and Sexual Activity    Alcohol use: Never    Drug use: Never    Sexual activity: Yes     Partners: Male       Vital Signs:   There were no vitals taken for this visit.     Physical exam    Alert and orient x3  Well appearing, well developed, in no acute distress   Unlabored respirations  Nontender/nondistended      Grossly oriented to person, place and time, judgment is intact, normal mood and affect              Assessment and Plan    Diagnoses and all orders for this visit:    1. Nephrolithiasis (Primary)        Nephrolithiasis    The patient was counseled on the preventative measures of kidney stones today.  This included increasing fluid intake to make at least 1.5 ml daily, decreasing meat intake, decreasing salt intake and taking in a normal amount of calcium (1000 mg daily).  Information handout given on this today.    We also discussed the DASH diet today for stone prevention and handout was given    Patient would like a metabolic stone workup we will have her follow-up 12/24 with 24 urine after pregnancy         Recurrent UTI      Discussed drinking enough fluid to make 1.5 L of urine daily  Discussed working on constipation with MiraLAX  Daily cranberry tablet  Daily d-mannose supplementation    Patient will make sure she gets a urine culture every time she gets treated with antibiotics

## 2024-05-20 ENCOUNTER — OFFICE VISIT (OUTPATIENT)
Dept: UROLOGY | Facility: CLINIC | Age: 24
End: 2024-05-20
Payer: COMMERCIAL

## 2024-05-20 ENCOUNTER — PATIENT ROUNDING (BHMG ONLY) (OUTPATIENT)
Dept: UROLOGY | Facility: CLINIC | Age: 24
End: 2024-05-20

## 2024-05-20 VITALS — HEIGHT: 60 IN | BODY MASS INDEX: 28.23 KG/M2 | WEIGHT: 143.8 LBS

## 2024-05-20 DIAGNOSIS — N20.0 NEPHROLITHIASIS: Primary | ICD-10-CM

## 2024-05-20 LAB
BILIRUB BLD-MCNC: NEGATIVE MG/DL
CLARITY, POC: CLEAR
COLOR UR: YELLOW
EXPIRATION DATE: 425
GLUCOSE UR STRIP-MCNC: NEGATIVE MG/DL
KETONES UR QL: NEGATIVE
LEUKOCYTE EST, POC: ABNORMAL
Lab: ABNORMAL
NITRITE UR-MCNC: NEGATIVE MG/ML
PH UR: 8.5 [PH] (ref 5–8)
PROT UR STRIP-MCNC: NEGATIVE MG/DL
RBC # UR STRIP: NEGATIVE /UL
SP GR UR: 1.01 (ref 1–1.03)
UROBILINOGEN UR QL: ABNORMAL

## 2024-05-20 NOTE — PROGRESS NOTES
Cherelle, my name is Angela. I am with  [facility], [address], [place] [address]. In an effort to hear the opinions of our patients, I would like to ask you a few questions about your visit with our office.     Can you tell me about your visit with us? What things went well?    We're always looking for ways to make our patients' experiences even better. Do you have any recommendations on ways we may improve?    Overall, were you satisfied with your first visit to our practice?    Is there a particular staff member/s who you would like to recognize for doing a great job?      I appreciate you taking the time to answer these questions. The providers and staff here in our office want you to get the healthcare you need and deserve and we look forward to your comments.     Thank you for your input and have a wonderful day!

## 2024-05-21 ENCOUNTER — LAB (OUTPATIENT)
Dept: LAB | Facility: HOSPITAL | Age: 24
End: 2024-05-21
Payer: COMMERCIAL

## 2024-05-21 ENCOUNTER — TELEPHONE (OUTPATIENT)
Dept: OBSTETRICS AND GYNECOLOGY | Facility: CLINIC | Age: 24
End: 2024-05-21
Payer: COMMERCIAL

## 2024-05-21 DIAGNOSIS — Z01.89 ROUTINE LAB DRAW: ICD-10-CM

## 2024-05-21 DIAGNOSIS — O99.810 GLUCOSE INTOLERANCE OF PREGNANCY: ICD-10-CM

## 2024-05-21 LAB
ALBUMIN SERPL-MCNC: 3.5 G/DL (ref 3.5–5.2)
ALBUMIN/GLOB SERPL: 1.4 G/DL
ALP SERPL-CCNC: 72 U/L (ref 39–117)
ALT SERPL W P-5'-P-CCNC: 13 U/L (ref 1–33)
ANION GAP SERPL CALCULATED.3IONS-SCNC: 11.3 MMOL/L (ref 5–15)
AST SERPL-CCNC: 14 U/L (ref 1–32)
BILIRUB SERPL-MCNC: <0.2 MG/DL (ref 0–1.2)
BUN SERPL-MCNC: 5 MG/DL (ref 6–20)
BUN/CREAT SERPL: 10.4 (ref 7–25)
CALCIUM SPEC-SCNC: 8.8 MG/DL (ref 8.6–10.5)
CHLORIDE SERPL-SCNC: 105 MMOL/L (ref 98–107)
CO2 SERPL-SCNC: 21.7 MMOL/L (ref 22–29)
CREAT SERPL-MCNC: 0.48 MG/DL (ref 0.57–1)
EGFRCR SERPLBLD CKD-EPI 2021: 138.4 ML/MIN/1.73
GLOBULIN UR ELPH-MCNC: 2.5 GM/DL
GLUCOSE BLDC GLUCOMTR-MCNC: 76 MG/DL (ref 70–99)
GLUCOSE P FAST SERPL-MCNC: 87 MG/DL (ref 65–94)
GLUCOSE SERPL-MCNC: 150 MG/DL (ref 65–99)
GTT GEST 2H PNL UR+SERPL: 166 MG/DL (ref 65–179)
GTT GEST 3H PNL SERPL: 137 MG/DL (ref 65–154)
GTT GEST 3H PNL SERPL: 95 MG/DL (ref 65–139)
POTASSIUM SERPL-SCNC: 3.5 MMOL/L (ref 3.5–5.2)
PROT SERPL-MCNC: 6 G/DL (ref 6–8.5)
SODIUM SERPL-SCNC: 138 MMOL/L (ref 136–145)
TSH SERPL DL<=0.05 MIU/L-ACNC: 1.61 UIU/ML (ref 0.27–4.2)

## 2024-05-21 PROCEDURE — 36415 COLL VENOUS BLD VENIPUNCTURE: CPT

## 2024-05-21 PROCEDURE — 82951 GLUCOSE TOLERANCE TEST (GTT): CPT

## 2024-05-21 PROCEDURE — 82952 GTT-ADDED SAMPLES: CPT

## 2024-05-21 PROCEDURE — 80053 COMPREHEN METABOLIC PANEL: CPT

## 2024-05-21 PROCEDURE — 84443 ASSAY THYROID STIM HORMONE: CPT

## 2024-05-21 NOTE — TELEPHONE ENCOUNTER
Caller: Fidel NEWTON    Relationship: Self    Best call back number: [phone]        Who are you requesting to speak with (clinical staff, DR. MORROW    What was the call regarding: PATIENT CALLED TO SEE IF GTT RESULTS ARE IN, PLEASE ADVISE

## 2024-05-21 NOTE — TELEPHONE ENCOUNTER
Patient informed results are in but provider is to review. We will be in contact once recommendations are given.

## 2024-05-22 ENCOUNTER — TELEPHONE (OUTPATIENT)
Dept: OBSTETRICS AND GYNECOLOGY | Facility: CLINIC | Age: 24
End: 2024-05-22

## 2024-05-22 NOTE — TELEPHONE ENCOUNTER
Caller: Fidel NEWTON    Relationship: Self    Best call back number: [phone]    Caller requesting test results:     What test was performed: GLUCOSE    When was the test performed: 05/21/24    Where was the test performed: [facility]     Additional notes: PT CALLING TO CHECK ON RESULTS; PT STATED SHE RECEIVED THEM THROUGH [facility] BUT WANTED TO KNOW IF DR MORROW HAS GONE OVER THEM YET. PLEASE CALL PT.

## 2024-05-24 ENCOUNTER — TELEPHONE (OUTPATIENT)
Dept: OBSTETRICS AND GYNECOLOGY | Facility: CLINIC | Age: 24
End: 2024-05-24
Payer: COMMERCIAL

## 2024-05-24 NOTE — TELEPHONE ENCOUNTER
Caller: Fidel NEWTON    Relationship: Self    Best call back number: [phone]    Caller requesting test results: 3 HR GTT    What test was performed: 3 HR GTT    When was the test performed: 5-21    Where was the test performed: HOSPITAL    Additional notes:

## 2024-05-29 NOTE — PROGRESS NOTES
OB FOLLOW UP      Chief Complaint   Patient presents with    Routine Prenatal Visit     Subjective:   No complaints    Objective:  /68   Wt 67.1 kg (148 lb)   LMP 11/22/2023   BMI 28.90 kg/m²  9.072 kg (20 lb) Facility age limit for growth %verena is 20 years.  See OB flow sheet for fundal height (not performed if US day of OV), FHT, edema, cvx exam if performed, and Upro/Uglu  Chaperone present during pelvic exam if performed.      Assessment and Plan:  27w5d     Diagnoses and all orders for this visit:    1. Supervision of other normal pregnancy, antepartum (Primary)  Overview:  JEANINE finalized: 8/28/24 by LMP and 6week US    CF/SMA neg.  NIPS neg XX.  AFP neg.    COVID: Recommended, declines  Flu: Recommended, declines  Tdap: Recommended, s/p Rx     28-32 weeks repeat TPA:  ? Desires Sterilization:  Declined    Anatomy US: [facility] 4/15/24 consistent w dates, posterior placenta, breech, wnl and completed  FU US:    PROBLEM LIST/PLAN:   Anxiety- Zoloft 25mg- continue  Stable   Anemia - Iron QOD-continue  Hx of kidney stones- stay hydrated, avoid TUMS.  Uro manages.    Elevated 1 hour-normal 3-hour  FOB brother w bleed do and kids w autism- FOB tested and neg for bleeding disorder  Rubella NI- vaccinate PP    Orders:  -     POC Urinalysis Dipstick      Counseling:    OB precautions, leaking, VB, tex nelson vs PTL/Labor  FKC    Reassuring pregnancy progress. Questions answered.  Continue PNV.  Importance of healthy eating, obtaining sufficient sleep, and staying active unless hypertensive- activity modified as directed.    Return in about 2 weeks (around 6/17/2024) for FU OB q2wks to 36weeks.            Jeanine Garcia, DO  06/03/2024    [facility]  [facility] [place] [address]  Dept: [phone]  Dept Fax: [phone]  Loc: [phone]  Loc Fax: [phone]

## 2024-05-31 ENCOUNTER — TELEPHONE (OUTPATIENT)
Dept: UROLOGY | Facility: CLINIC | Age: 24
End: 2024-05-31
Payer: COMMERCIAL

## 2024-05-31 NOTE — TELEPHONE ENCOUNTER
PATIENT CALLED.  SHE IS PREGNANT AND WILL HAVE THE BABY IN AUGUST.  SHE SAID DR. JORDAN TOLD HER HE WANTED HER TO DO A 24-HOUR URINE IN DECEMBER, AFTER SHE IS FINISHED BEING PREGNANT.    SHE GOT THE 24-HOUR URINE KIT IN THE MAIL, ALREADY.  DOES SHE NEED TO DO IT NOW, OR WAIT?

## 2024-06-03 ENCOUNTER — ROUTINE PRENATAL (OUTPATIENT)
Dept: OBSTETRICS AND GYNECOLOGY | Facility: CLINIC | Age: 24
End: 2024-06-03
Payer: COMMERCIAL

## 2024-06-03 VITALS — BODY MASS INDEX: 28.9 KG/M2 | SYSTOLIC BLOOD PRESSURE: 110 MMHG | DIASTOLIC BLOOD PRESSURE: 68 MMHG | WEIGHT: 148 LBS

## 2024-06-03 DIAGNOSIS — Z34.80 SUPERVISION OF OTHER NORMAL PREGNANCY, ANTEPARTUM: Primary | ICD-10-CM

## 2024-06-03 LAB
GLUCOSE UR STRIP-MCNC: NEGATIVE MG/DL
PROT UR STRIP-MCNC: NEGATIVE MG/DL

## 2024-06-03 PROCEDURE — 0502F SUBSEQUENT PRENATAL CARE: CPT | Performed by: OBSTETRICS & GYNECOLOGY

## 2024-06-17 ENCOUNTER — ROUTINE PRENATAL (OUTPATIENT)
Dept: OBSTETRICS AND GYNECOLOGY | Facility: CLINIC | Age: 24
End: 2024-06-17
Payer: COMMERCIAL

## 2024-06-17 VITALS — DIASTOLIC BLOOD PRESSURE: 81 MMHG | SYSTOLIC BLOOD PRESSURE: 119 MMHG | BODY MASS INDEX: 29.29 KG/M2 | WEIGHT: 150 LBS

## 2024-06-17 DIAGNOSIS — F41.9 ANXIETY DISORDER AFFECTING PREGNANCY, ANTEPARTUM: ICD-10-CM

## 2024-06-17 DIAGNOSIS — O09.899 RUBELLA NON-IMMUNE STATUS, ANTEPARTUM: ICD-10-CM

## 2024-06-17 DIAGNOSIS — O99.019 MATERNAL ANEMIA IN PREGNANCY, ANTEPARTUM: ICD-10-CM

## 2024-06-17 DIAGNOSIS — Z28.39 RUBELLA NON-IMMUNE STATUS, ANTEPARTUM: ICD-10-CM

## 2024-06-17 DIAGNOSIS — O99.340 ANXIETY DISORDER AFFECTING PREGNANCY, ANTEPARTUM: ICD-10-CM

## 2024-06-17 DIAGNOSIS — Z34.80 SUPERVISION OF OTHER NORMAL PREGNANCY, ANTEPARTUM: Primary | ICD-10-CM

## 2024-06-17 PROBLEM — R73.09 ELEVATED GLUCOSE TOLERANCE TEST: Status: RESOLVED | Noted: 2024-05-13 | Resolved: 2024-06-17

## 2024-06-17 LAB
GLUCOSE UR STRIP-MCNC: NEGATIVE MG/DL
PROT UR STRIP-MCNC: NEGATIVE MG/DL

## 2024-06-17 PROCEDURE — 0502F SUBSEQUENT PRENATAL CARE: CPT | Performed by: OBSTETRICS & GYNECOLOGY

## 2024-06-17 NOTE — PROGRESS NOTES
[facility]  OB Follow Up Visit    CC: Routine obstetrical visit    Prenatal care complicated by:  Patient Active Problem List   Diagnosis    Other cerebral palsy    Anxiety    Supervision of other normal pregnancy, antepartum    Rubella non-immune status, antepartum    Maternal anemia in pregnancy, antepartum    Nephrolithiasis    Anxiety disorder affecting pregnancy, antepartum     Subjective:   Fidel NEWTON is a 23 y.o.  29w5d patient being seen today for her obstetrical follow up visit. The patient has: No complaints, No leaking fluid, No vaginal bleeding, No contractions, Adequate FM    History: Past medical and surgical history, medications, allergies, social history, and obstetrical history all reviewed and updated.    Objective:    Urine glucose/protein - See OB flow sheet      /81   Wt 68 kg (150 lb)   LMP 2023   BMI 29.29 kg/m²     General exam: Comfortable, NAD  FHR: 156 BPM   Uterine Size:  29 cm  Pelvic Exam: No    Assessment and Plan:  Diagnoses and all orders for this visit:    1. Supervision of other normal pregnancy, antepartum (Primary)  Overview:  JEANINE finalized: 24 by LMP and 6week US    CF/SMA neg.  NIPS neg XX.  AFP neg.    COVID: Recommended, declines  Flu: Recommended, declines  Tdap: Recommended, s/p Rx     Anatomy US: [facility] 4/15/24 consistent w dates, posterior placenta, breech, wnl and completed    Assessment & Plan:  Continue prenatal vitamins  Fetal kick counts   labor warnings    Orders:  -     POC Urinalysis Dipstick    2. Maternal anemia in pregnancy, antepartum  Assessment & Plan:  Continue ferrous sulfate      3. Anxiety disorder affecting pregnancy, antepartum  Assessment & Plan:  Symptoms are stable.  Continue Zoloft 25 mg daily      4. Rubella non-immune status, antepartum  Overview:  Vaccine postpartum        29w5d  Reassuring pregnancy progress    Counseling: OB precautions, leaking, VB, tex nelson vs  PTL/Labor  FKC    Questions answered    Return in about 2 weeks (around 7/1/2024) for Recheck.    Anshul Echeverria MD  06/17/2024

## 2024-06-26 NOTE — PROGRESS NOTES
OB FOLLOW UP      Chief Complaint   Patient presents with    Routine Prenatal Visit     Subjective:   No complaints    Objective:  /85   Wt 69.9 kg (154 lb)   LMP 11/22/2023   BMI 30.08 kg/m²  11.8 kg (26 lb) Facility age limit for growth %verena is 20 years.  See OB flow sheet for fundal height (not performed if US day of OV), FHT, edema, cvx exam if performed, and Upro/Uglu  Chaperone present during pelvic exam if performed.      Assessment and Plan:  32w2d     Diagnoses and all orders for this visit:    1. Supervision of other normal pregnancy, antepartum (Primary)  Overview:  JEANINE finalized: 8/28/24 by LMP and 6week US    CF/SMA neg.  NIPS neg XX.  AFP neg.    COVID: Recommended, declines  Flu: Recommended, declines  Tdap: Recommended, s/p Rx     28-32 weeks repeat TPA:  7/5/2024   ? Desires Sterilization:  Declined    Anatomy US: [facility] 4/15/24 consistent w dates, posterior placenta, breech, wnl and completed      PROBLEM LIST/PLAN:   Anxiety- Zoloft 25mg- continue  Stable   Anemia - Iron QOD-continue  Hx of kidney stones- stay hydrated, avoid TUMS.  Uro manages.     Elevated 1 hour-normal 3-hour  FOB brother w bleed do and kids w autism- FOB tested and neg for bleeding disorder  Rubella NI- vaccinate PP    Orders:  -     POC Urinalysis Dipstick  -     RPR, Rfx Qn RPR / Confirm TP    2. Fetal size inconsistent with dates  -     US Ob 14 + Weeks Single or First Gestation; Future      Counseling:    OB precautions, leaking, VB, tex nelson vs PTL/Labor  FKC    Reassuring pregnancy progress. Questions answered.  Continue PNV.  Importance of healthy eating, obtaining sufficient sleep, and staying active unless hypertensive- activity modified as directed.    Return in about 2 weeks (around 7/19/2024) for FU OB q2wks to 36weeks, US in 4weeks.  Then OV qweek to JEANINE.            Jeanine Garcia, DO  07/05/2024    [facility] [place] [facility]  [address] [place] [address]  Dept:  [phone]  Dept Fax: [phone]  Loc: [phone]  Loc Fax: [phone]

## 2024-07-05 ENCOUNTER — ROUTINE PRENATAL (OUTPATIENT)
Dept: OBSTETRICS AND GYNECOLOGY | Facility: CLINIC | Age: 24
End: 2024-07-05
Payer: COMMERCIAL

## 2024-07-05 VITALS — SYSTOLIC BLOOD PRESSURE: 125 MMHG | DIASTOLIC BLOOD PRESSURE: 85 MMHG | WEIGHT: 154 LBS | BODY MASS INDEX: 30.08 KG/M2

## 2024-07-05 DIAGNOSIS — Z34.80 SUPERVISION OF OTHER NORMAL PREGNANCY, ANTEPARTUM: Primary | ICD-10-CM

## 2024-07-05 DIAGNOSIS — O26.849 FETAL SIZE INCONSISTENT WITH DATES: ICD-10-CM

## 2024-07-05 LAB
GLUCOSE UR STRIP-MCNC: NEGATIVE MG/DL
PROT UR STRIP-MCNC: NEGATIVE MG/DL

## 2024-07-05 PROCEDURE — 86592 SYPHILIS TEST NON-TREP QUAL: CPT | Performed by: OBSTETRICS & GYNECOLOGY

## 2024-07-07 LAB — RPR SER QL: NON REACTIVE

## 2024-07-11 NOTE — PROGRESS NOTES
OB FOLLOW UP      Chief Complaint   Patient presents with    Routine Prenatal Visit     Subjective:   No complaints    Objective:  /86   Wt 71.5 kg (157 lb 9.6 oz)   LMP 11/22/2023   BMI 30.78 kg/m²  13.4 kg (29 lb 9.6 oz) Body mass index is 30.78 kg/m².  See OB flow sheet for fundal height (not performed if US day of OV), FHT, edema, cvx exam if performed, and Upro/Uglu. Chaperone present during pelvic exam if performed.      Assessment and Plan:  33w5d     Diagnoses and all orders for this visit:    1. Supervision of other normal pregnancy, antepartum (Primary)  Overview:  JEANINE finalized: 8/28/24 by LMP and 6week US    CF/SMA neg.  NIPS neg XX.  AFP neg.    COVID: Recommended, declines  Flu: Recommended, declines  Tdap: Recommended, s/p Rx     28-32 weeks repeat TPA:  NEG  ? Desires Sterilization:  Declined    Anatomy US: [facility] 4/15/24 consistent w dates, posterior placenta, breech, wnl and completed  FU US: [facility] 8/5/24      PROBLEM LIST/PLAN:   Anxiety- Zoloft 25mg- continue  Stable   Anemia - Iron QOD-continue  Hx of kidney stones- stay hydrated, avoid TUMS.  Uro manages.     Elevated 1 hour-normal 3-hour  FOB brother w bleed do and kids w autism- FOB tested and neg for bleeding disorder  Rubella NI- vaccinate PP    Orders:  -     POC Urinalysis Dipstick      Counseling:    OB precautions, leaking, VB, tex nelson vs PTL/Labor  FKC    Reassuring pregnancy progress. Questions answered.  Continue PNV.  Importance of healthy eating, obtaining sufficient sleep, and staying active unless hypertensive- activity modified as directed.    Return in about 2 weeks (around 7/29/2024) for FU OB then weekly to JEANINE.            Jeanine Garcia, DO  07/15/2024    [facility] [place] [facility]  [address] [place] [address]  Dept: [phone]  Dept Fax: [phone]  Loc: [phone]  Loc Fax: [phone]

## 2024-07-15 ENCOUNTER — ROUTINE PRENATAL (OUTPATIENT)
Dept: OBSTETRICS AND GYNECOLOGY | Facility: CLINIC | Age: 24
End: 2024-07-15
Payer: COMMERCIAL

## 2024-07-15 VITALS — BODY MASS INDEX: 30.78 KG/M2 | DIASTOLIC BLOOD PRESSURE: 86 MMHG | SYSTOLIC BLOOD PRESSURE: 123 MMHG | WEIGHT: 157.6 LBS

## 2024-07-15 DIAGNOSIS — Z34.80 SUPERVISION OF OTHER NORMAL PREGNANCY, ANTEPARTUM: Primary | ICD-10-CM

## 2024-07-15 LAB
GLUCOSE UR STRIP-MCNC: NEGATIVE MG/DL
PROT UR STRIP-MCNC: NEGATIVE MG/DL

## 2024-07-15 PROCEDURE — 0502F SUBSEQUENT PRENATAL CARE: CPT | Performed by: OBSTETRICS & GYNECOLOGY

## 2024-07-25 DIAGNOSIS — K21.9 GASTROESOPHAGEAL REFLUX DISEASE WITHOUT ESOPHAGITIS: ICD-10-CM

## 2024-07-25 RX ORDER — FAMOTIDINE 20 MG/1
TABLET, FILM COATED ORAL
Qty: 60 TABLET | Refills: 1 | Status: SHIPPED | OUTPATIENT
Start: 2024-07-25

## 2024-07-29 ENCOUNTER — ROUTINE PRENATAL (OUTPATIENT)
Dept: OBSTETRICS AND GYNECOLOGY | Facility: CLINIC | Age: 24
End: 2024-07-29
Payer: COMMERCIAL

## 2024-07-29 VITALS — BODY MASS INDEX: 31.64 KG/M2 | DIASTOLIC BLOOD PRESSURE: 87 MMHG | SYSTOLIC BLOOD PRESSURE: 125 MMHG | WEIGHT: 162 LBS

## 2024-07-29 DIAGNOSIS — O99.019 MATERNAL ANEMIA IN PREGNANCY, ANTEPARTUM: ICD-10-CM

## 2024-07-29 DIAGNOSIS — Z34.80 SUPERVISION OF OTHER NORMAL PREGNANCY, ANTEPARTUM: Primary | ICD-10-CM

## 2024-07-29 LAB
GLUCOSE UR STRIP-MCNC: NEGATIVE MG/DL
PROT UR STRIP-MCNC: NEGATIVE MG/DL

## 2024-07-29 NOTE — PROGRESS NOTES
[facility]  OB Follow Up Visit    CC: Routine obstetrical visit    Prenatal care complicated by:  Patient Active Problem List   Diagnosis    Other cerebral palsy    Anxiety    Supervision of other normal pregnancy, antepartum    Rubella non-immune status, antepartum    Maternal anemia in pregnancy, antepartum    Nephrolithiasis     Subjective:   Fidel NEWTON is a 23 y.o.  35w5d patient being seen today for her obstetrical follow up visit. The patient has: No complaints, No leaking fluid, No vaginal bleeding, Adequate FM  Reports increasing contractions but still irregular.    History: Past medical and surgical history, medications, allergies, social history, and obstetrical history all reviewed and updated.    Objective:    Urine glucose/protein - See OB flow sheet      /87   Wt 73.5 kg (162 lb)   LMP 2023   BMI 31.64 kg/m²     General exam: Comfortable, NAD  FHR: 146 BPM   Uterine Size:  37 cm  Pelvic Exam: No    Assessment and Plan:  Diagnoses and all orders for this visit:    1. Supervision of other normal pregnancy, antepartum (Primary)  Overview:  JEANINE finalized: 24 by LMP and 6week US    CF/SMA neg.  NIPS neg XX.  AFP neg.    COVID: Recommended, declines  Flu: Recommended, declines  Tdap: Recommended, s/p Rx     Anatomy US: [facility] 4/15/24 consistent w dates, posterior placenta, breech, wnl and completed    Assessment & Plan:  Continue prenatal vitamins  Fetal kick counts   labor warnings  GBS next office visit    Orders:  -     POC Urinalysis Dipstick    2. Maternal anemia in pregnancy, antepartum  Assessment & Plan:  Continue ferrous sulfate        35w5d  Reassuring pregnancy progress    Counseling: OB precautions, leaking, VB, tex nelson vs PTL/Labor  FKC    Questions answered    Return in about 1 week (around 2024) for Recheck.    Anshul Echeverria MD  2024

## 2024-08-05 ENCOUNTER — HOSPITAL ENCOUNTER (INPATIENT)
Facility: HOSPITAL | Age: 24
LOS: 4 days | Discharge: HOME | End: 2024-08-09
Attending: OBSTETRICS & GYNECOLOGY | Admitting: OBSTETRICS & GYNECOLOGY
Payer: COMMERCIAL

## 2024-08-05 ENCOUNTER — ROUTINE PRENATAL (OUTPATIENT)
Dept: OBSTETRICS AND GYNECOLOGY | Facility: CLINIC | Age: 24
End: 2024-08-05
Payer: COMMERCIAL

## 2024-08-05 ENCOUNTER — ANESTHESIA (OUTPATIENT)
Dept: LABOR AND DELIVERY | Facility: HOSPITAL | Age: 24
End: 2024-08-05
Payer: COMMERCIAL

## 2024-08-05 ENCOUNTER — ANESTHESIA EVENT (OUTPATIENT)
Dept: LABOR AND DELIVERY | Facility: HOSPITAL | Age: 24
End: 2024-08-05
Payer: COMMERCIAL

## 2024-08-05 VITALS
BODY MASS INDEX: 32.54 KG/M2 | WEIGHT: 166.6 LBS | DIASTOLIC BLOOD PRESSURE: 107 MMHG | SYSTOLIC BLOOD PRESSURE: 144 MMHG

## 2024-08-05 DIAGNOSIS — O40.3XX0 POLYHYDRAMNIOS IN THIRD TRIMESTER COMPLICATION, SINGLE OR UNSPECIFIED FETUS: ICD-10-CM

## 2024-08-05 DIAGNOSIS — K21.9 GASTROESOPHAGEAL REFLUX DISEASE WITHOUT ESOPHAGITIS: ICD-10-CM

## 2024-08-05 DIAGNOSIS — R03.0 ELEVATED BLOOD PRESSURE READING: ICD-10-CM

## 2024-08-05 DIAGNOSIS — Z34.80 SUPERVISION OF OTHER NORMAL PREGNANCY, ANTEPARTUM: Primary | ICD-10-CM

## 2024-08-05 PROBLEM — Z37.9 NORMAL LABOR: Status: ACTIVE | Noted: 2024-08-05

## 2024-08-05 PROBLEM — O13.3 GESTATIONAL HYPERTENSION, THIRD TRIMESTER: Status: ACTIVE | Noted: 2024-08-05

## 2024-08-05 PROBLEM — O42.90 AMNIOTIC FLUID LEAKING: Status: ACTIVE | Noted: 2024-08-05

## 2024-08-05 LAB
A1 MICROGLOB PLACENTAL VAG QL: POSITIVE
ABO GROUP BLD: NORMAL
ALBUMIN SERPL-MCNC: 3.4 G/DL (ref 3.5–5.2)
ALBUMIN/GLOB SERPL: 1.1 G/DL
ALP SERPL-CCNC: 218 U/L (ref 39–117)
ALT SERPL W P-5'-P-CCNC: 15 U/L (ref 1–33)
ANION GAP SERPL CALCULATED.3IONS-SCNC: 13.7 MMOL/L (ref 5–15)
AST SERPL-CCNC: 21 U/L (ref 1–32)
BILIRUB SERPL-MCNC: 0.3 MG/DL (ref 0–1.2)
BLD GP AB SCN SERPL QL: NEGATIVE
BUN SERPL-MCNC: 4 MG/DL (ref 6–20)
BUN/CREAT SERPL: 7.7 (ref 7–25)
CALCIUM SPEC-SCNC: 9 MG/DL (ref 8.6–10.5)
CHLORIDE SERPL-SCNC: 103 MMOL/L (ref 98–107)
CO2 SERPL-SCNC: 20.3 MMOL/L (ref 22–29)
CREAT SERPL-MCNC: 0.52 MG/DL (ref 0.57–1)
CREAT UR-MCNC: 75.8 MG/DL
DEPRECATED RDW RBC AUTO: 46.7 FL (ref 37–54)
EGFRCR SERPLBLD CKD-EPI 2021: 135.8 ML/MIN/1.73
ERYTHROCYTE [DISTWIDTH] IN BLOOD BY AUTOMATED COUNT: 13.9 % (ref 12.3–15.4)
GLOBULIN UR ELPH-MCNC: 3.2 GM/DL
GLUCOSE SERPL-MCNC: 87 MG/DL (ref 65–99)
GLUCOSE UR STRIP-MCNC: NEGATIVE MG/DL
HCT VFR BLD AUTO: 36.4 % (ref 34–46.6)
HGB BLD-MCNC: 12 G/DL (ref 12–15.9)
MCH RBC QN AUTO: 30.1 PG (ref 26.6–33)
MCHC RBC AUTO-ENTMCNC: 33 G/DL (ref 31.5–35.7)
MCV RBC AUTO: 91.2 FL (ref 79–97)
PLATELET # BLD AUTO: 285 10*3/MM3 (ref 140–450)
PMV BLD AUTO: 10.5 FL (ref 6–12)
POTASSIUM SERPL-SCNC: 3.8 MMOL/L (ref 3.5–5.2)
PROT ?TM UR-MCNC: 19.9 MG/DL
PROT SERPL-MCNC: 6.6 G/DL (ref 6–8.5)
PROT UR STRIP-MCNC: NEGATIVE MG/DL
PROT/CREAT UR: 0.26 MG/G{CREAT}
RBC # BLD AUTO: 3.99 10*6/MM3 (ref 3.77–5.28)
RH BLD: POSITIVE
SODIUM SERPL-SCNC: 137 MMOL/L (ref 136–145)
T&S EXPIRATION DATE: NORMAL
TREPONEMA PALLIDUM IGG+IGM AB [PRESENCE] IN SERUM OR PLASMA BY IMMUNOASSAY: NORMAL
WBC NRBC COR # BLD AUTO: 11.23 10*3/MM3 (ref 3.4–10.8)

## 2024-08-05 PROCEDURE — 80307 DRUG TEST PRSMV CHEM ANLYZR: CPT | Performed by: OBSTETRICS & GYNECOLOGY

## 2024-08-05 PROCEDURE — C1755 CATHETER, INTRASPINAL: HCPCS | Performed by: NURSE ANESTHETIST, CERTIFIED REGISTERED

## 2024-08-05 PROCEDURE — 87653 STREP B DNA AMP PROBE: CPT | Performed by: OBSTETRICS & GYNECOLOGY

## 2024-08-05 PROCEDURE — 84112 EVAL AMNIOTIC FLUID PROTEIN: CPT | Performed by: OBSTETRICS & GYNECOLOGY

## 2024-08-05 PROCEDURE — 25010000002 HYDROMORPHONE 1 MG/ML SOLUTION: Performed by: OBSTETRICS & GYNECOLOGY

## 2024-08-05 PROCEDURE — 84156 ASSAY OF PROTEIN URINE: CPT | Performed by: OBSTETRICS & GYNECOLOGY

## 2024-08-05 PROCEDURE — 85027 COMPLETE CBC AUTOMATED: CPT | Performed by: OBSTETRICS & GYNECOLOGY

## 2024-08-05 PROCEDURE — 25010000002 PENICILLIN G POTASSIUM PER 600000 UNITS: Performed by: OBSTETRICS & GYNECOLOGY

## 2024-08-05 PROCEDURE — 80053 COMPREHEN METABOLIC PANEL: CPT | Performed by: OBSTETRICS & GYNECOLOGY

## 2024-08-05 PROCEDURE — 0502F SUBSEQUENT PRENATAL CARE: CPT | Performed by: OBSTETRICS & GYNECOLOGY

## 2024-08-05 PROCEDURE — 25010000002 FENTANYL CITRATE (PF) 50 MCG/ML SOLUTION: Performed by: NURSE ANESTHETIST, CERTIFIED REGISTERED

## 2024-08-05 PROCEDURE — 86900 BLOOD TYPING SEROLOGIC ABO: CPT | Performed by: OBSTETRICS & GYNECOLOGY

## 2024-08-05 PROCEDURE — 25010000002 BUPIVACAINE (PF) 0.25 % SOLUTION: Performed by: NURSE ANESTHETIST, CERTIFIED REGISTERED

## 2024-08-05 PROCEDURE — 86850 RBC ANTIBODY SCREEN: CPT | Performed by: OBSTETRICS & GYNECOLOGY

## 2024-08-05 PROCEDURE — 86901 BLOOD TYPING SEROLOGIC RH(D): CPT | Performed by: OBSTETRICS & GYNECOLOGY

## 2024-08-05 PROCEDURE — 82570 ASSAY OF URINE CREATININE: CPT | Performed by: OBSTETRICS & GYNECOLOGY

## 2024-08-05 PROCEDURE — 25810000003 LACTATED RINGERS PER 1000 ML: Performed by: OBSTETRICS & GYNECOLOGY

## 2024-08-05 PROCEDURE — 86780 TREPONEMA PALLIDUM: CPT | Performed by: OBSTETRICS & GYNECOLOGY

## 2024-08-05 RX ORDER — ONDANSETRON 2 MG/ML
4 INJECTION INTRAMUSCULAR; INTRAVENOUS EVERY 6 HOURS PRN
Status: DISCONTINUED | OUTPATIENT
Start: 2024-08-05 | End: 2024-08-06

## 2024-08-05 RX ORDER — FENTANYL CITRATE 50 UG/ML
INJECTION, SOLUTION INTRAMUSCULAR; INTRAVENOUS
Status: COMPLETED
Start: 2024-08-05 | End: 2024-08-05

## 2024-08-05 RX ORDER — FENTANYL CITRATE 50 UG/ML
INJECTION, SOLUTION INTRAMUSCULAR; INTRAVENOUS
Status: COMPLETED | OUTPATIENT
Start: 2024-08-05 | End: 2024-08-06

## 2024-08-05 RX ORDER — OXYTOCIN/0.9 % SODIUM CHLORIDE 30/500 ML
999 PLASTIC BAG, INJECTION (ML) INTRAVENOUS ONCE
Status: CANCELLED | OUTPATIENT
Start: 2024-08-05 | End: 2024-08-05

## 2024-08-05 RX ORDER — MISOPROSTOL 200 UG/1
800 TABLET ORAL AS NEEDED
Status: DISCONTINUED | OUTPATIENT
Start: 2024-08-05 | End: 2024-08-06

## 2024-08-05 RX ORDER — ONDANSETRON 2 MG/ML
4 INJECTION INTRAMUSCULAR; INTRAVENOUS EVERY 6 HOURS PRN
Status: CANCELLED | OUTPATIENT
Start: 2024-08-05

## 2024-08-05 RX ORDER — SODIUM CHLORIDE 0.9 % (FLUSH) 0.9 %
10 SYRINGE (ML) INJECTION AS NEEDED
Status: DISCONTINUED | OUTPATIENT
Start: 2024-08-05 | End: 2024-08-06

## 2024-08-05 RX ORDER — OXYTOCIN/0.9 % SODIUM CHLORIDE 30/500 ML
250 PLASTIC BAG, INJECTION (ML) INTRAVENOUS CONTINUOUS
Status: CANCELLED | OUTPATIENT
Start: 2024-08-05 | End: 2024-08-05

## 2024-08-05 RX ORDER — SODIUM CHLORIDE 9 MG/ML
40 INJECTION, SOLUTION INTRAVENOUS AS NEEDED
Status: DISCONTINUED | OUTPATIENT
Start: 2024-08-05 | End: 2024-08-06

## 2024-08-05 RX ORDER — LIDOCAINE HYDROCHLORIDE AND EPINEPHRINE 15; 5 MG/ML; UG/ML
INJECTION, SOLUTION EPIDURAL
Status: COMPLETED | OUTPATIENT
Start: 2024-08-05 | End: 2024-08-05

## 2024-08-05 RX ORDER — BUPIVACAINE HYDROCHLORIDE 2.5 MG/ML
INJECTION, SOLUTION EPIDURAL; INFILTRATION; INTRACAUDAL AS NEEDED
Status: DISCONTINUED | OUTPATIENT
Start: 2024-08-05 | End: 2024-08-06 | Stop reason: SURG

## 2024-08-05 RX ORDER — SODIUM CHLORIDE, SODIUM LACTATE, POTASSIUM CHLORIDE, CALCIUM CHLORIDE 600; 310; 30; 20 MG/100ML; MG/100ML; MG/100ML; MG/100ML
125 INJECTION, SOLUTION INTRAVENOUS CONTINUOUS
Status: DISCONTINUED | OUTPATIENT
Start: 2024-08-05 | End: 2024-08-06

## 2024-08-05 RX ORDER — ONDANSETRON 4 MG/1
4 TABLET, ORALLY DISINTEGRATING ORAL EVERY 6 HOURS PRN
Status: CANCELLED | OUTPATIENT
Start: 2024-08-05

## 2024-08-05 RX ORDER — LIDOCAINE HYDROCHLORIDE 10 MG/ML
0.5 INJECTION, SOLUTION EPIDURAL; INFILTRATION; INTRACAUDAL; PERINEURAL ONCE AS NEEDED
Status: DISCONTINUED | OUTPATIENT
Start: 2024-08-05 | End: 2024-08-06

## 2024-08-05 RX ORDER — SODIUM CHLORIDE 0.9 % (FLUSH) 0.9 %
10 SYRINGE (ML) INJECTION EVERY 12 HOURS SCHEDULED
Status: DISCONTINUED | OUTPATIENT
Start: 2024-08-05 | End: 2024-08-06

## 2024-08-05 RX ORDER — PANTOPRAZOLE SODIUM 20 MG/1
20 TABLET, DELAYED RELEASE ORAL DAILY PRN
Qty: 30 TABLET | Refills: 1 | Status: SHIPPED | OUTPATIENT
Start: 2024-08-05 | End: 2024-08-09 | Stop reason: HOSPADM

## 2024-08-05 RX ORDER — METHYLERGONOVINE MALEATE 0.2 MG/ML
200 INJECTION INTRAVENOUS ONCE AS NEEDED
Status: DISCONTINUED | OUTPATIENT
Start: 2024-08-05 | End: 2024-08-06

## 2024-08-05 RX ORDER — BUPIVACAINE HYDROCHLORIDE 2.5 MG/ML
INJECTION, SOLUTION EPIDURAL; INFILTRATION; INTRACAUDAL
Status: COMPLETED
Start: 2024-08-05 | End: 2024-08-05

## 2024-08-05 RX ORDER — TERBUTALINE SULFATE 1 MG/ML
0.25 INJECTION, SOLUTION SUBCUTANEOUS AS NEEDED
Status: DISCONTINUED | OUTPATIENT
Start: 2024-08-05 | End: 2024-08-06

## 2024-08-05 RX ORDER — CITRIC ACID/SODIUM CITRATE 334-500MG
30 SOLUTION, ORAL ORAL ONCE AS NEEDED
Status: COMPLETED | OUTPATIENT
Start: 2024-08-05 | End: 2024-08-05

## 2024-08-05 RX ORDER — IBUPROFEN 600 MG/1
600 TABLET ORAL EVERY 6 HOURS PRN
Status: CANCELLED | OUTPATIENT
Start: 2024-08-05

## 2024-08-05 RX ORDER — CARBOPROST TROMETHAMINE 250 UG/ML
250 INJECTION, SOLUTION INTRAMUSCULAR AS NEEDED
Status: DISCONTINUED | OUTPATIENT
Start: 2024-08-05 | End: 2024-08-06

## 2024-08-05 RX ORDER — EPHEDRINE SULFATE 50 MG/ML
5 INJECTION, SOLUTION INTRAVENOUS
Status: DISCONTINUED | OUTPATIENT
Start: 2024-08-05 | End: 2024-08-06

## 2024-08-05 RX ORDER — ACETAMINOPHEN 325 MG/1
650 TABLET ORAL EVERY 4 HOURS PRN
Status: DISCONTINUED | OUTPATIENT
Start: 2024-08-05 | End: 2024-08-06

## 2024-08-05 RX ORDER — OXYTOCIN/0.9 % SODIUM CHLORIDE 30/500 ML
2-20 PLASTIC BAG, INJECTION (ML) INTRAVENOUS
Status: DISCONTINUED | OUTPATIENT
Start: 2024-08-05 | End: 2024-08-06

## 2024-08-05 RX ORDER — ONDANSETRON 4 MG/1
4 TABLET, ORALLY DISINTEGRATING ORAL EVERY 6 HOURS PRN
Status: DISCONTINUED | OUTPATIENT
Start: 2024-08-05 | End: 2024-08-06

## 2024-08-05 RX ADMIN — SODIUM CHLORIDE, POTASSIUM CHLORIDE, SODIUM LACTATE AND CALCIUM CHLORIDE 125 ML/HR: 600; 310; 30; 20 INJECTION, SOLUTION INTRAVENOUS at 12:16

## 2024-08-05 RX ADMIN — FENTANYL CITRATE 100 MCG: 50 INJECTION, SOLUTION INTRAMUSCULAR; INTRAVENOUS at 19:43

## 2024-08-05 RX ADMIN — SODIUM CHLORIDE, POTASSIUM CHLORIDE, SODIUM LACTATE AND CALCIUM CHLORIDE 125 ML/HR: 600; 310; 30; 20 INJECTION, SOLUTION INTRAVENOUS at 14:17

## 2024-08-05 RX ADMIN — SODIUM CITRATE AND CITRIC ACID MONOHYDRATE 30 ML: 334; 500 SOLUTION ORAL at 18:55

## 2024-08-05 RX ADMIN — BUPIVACAINE HYDROCHLORIDE 3 ML: 2.5 INJECTION, SOLUTION EPIDURAL; INFILTRATION; INTRACAUDAL; PERINEURAL at 19:43

## 2024-08-05 RX ADMIN — PENICILLIN G POTASSIUM 5 MILLION UNITS: 5000000 INJECTION, POWDER, FOR SOLUTION INTRAMUSCULAR; INTRAVENOUS at 12:32

## 2024-08-05 RX ADMIN — HYDROMORPHONE HYDROCHLORIDE 0.5 MG: 1 INJECTION, SOLUTION INTRAMUSCULAR; INTRAVENOUS; SUBCUTANEOUS at 17:45

## 2024-08-05 RX ADMIN — Medication 2 MILLI-UNITS/MIN: at 12:40

## 2024-08-05 RX ADMIN — HYDROMORPHONE HYDROCHLORIDE 0.5 MG: 1 INJECTION, SOLUTION INTRAMUSCULAR; INTRAVENOUS; SUBCUTANEOUS at 15:00

## 2024-08-05 RX ADMIN — PENICILLIN G POTASSIUM 2.5 MILLION UNITS: 5000000 INJECTION, POWDER, FOR SOLUTION INTRAMUSCULAR; INTRAVENOUS at 23:26

## 2024-08-05 RX ADMIN — SODIUM CHLORIDE, POTASSIUM CHLORIDE, SODIUM LACTATE AND CALCIUM CHLORIDE 125 ML/HR: 600; 310; 30; 20 INJECTION, SOLUTION INTRAVENOUS at 20:02

## 2024-08-05 RX ADMIN — PENICILLIN G POTASSIUM 2.5 MILLION UNITS: 5000000 INJECTION, POWDER, FOR SOLUTION INTRAMUSCULAR; INTRAVENOUS at 19:47

## 2024-08-05 RX ADMIN — LIDOCAINE HYDROCHLORIDE AND EPINEPHRINE 3 ML: 15; 5 INJECTION, SOLUTION EPIDURAL at 19:40

## 2024-08-05 RX ADMIN — LIDOCAINE HYDROCHLORIDE AND EPINEPHRINE 2 ML: 15; 5 INJECTION, SOLUTION EPIDURAL at 19:43

## 2024-08-05 RX ADMIN — Medication 10 ML/HR: at 19:47

## 2024-08-05 RX ADMIN — PENICILLIN G POTASSIUM 2.5 MILLION UNITS: 5000000 INJECTION, POWDER, FOR SOLUTION INTRAMUSCULAR; INTRAVENOUS at 15:52

## 2024-08-05 NOTE — PROGRESS NOTES
OB Intrapartum Note    Subjective: No complaints, Pain controlled    Objective:  Baseline: Normal 110-160 bpm  Variability:   Moderate/Normal (amplitude 6-25 bpm)  Accelerations: Present (32 weeks+) 15 x 15 bpm  Decelerations: None  Contractions:  Regular, q2min, Pitocin at 10milliunits/min    Cervical exam:    Dilation: 3-4cm    Effacement: 100%    Station: 0/Engaged, forebag ruptured, clear    Impression:    Category I  Reassuring fetus, Adequate progress, Clear fluid, Pain controlled    Plan:   Continue pitocin  Continue to monitor  Active labor positioning  Pelvis feels clinically adequate  I have discussed in detail with patient the current plan to include, but NLT, appropriate expectations for labor and delivery, to include possible length of time expected for delivery and hospital stay.  All questions have been answered to pts satisfaction and pt desires to proceed as noted.  Specific labor and delivery desires: FOB to cut the cord        Electronically signed by Jeanine Garcia DO, 08/05/24, 6:15 PM EDT.

## 2024-08-05 NOTE — ANESTHESIA PROCEDURE NOTES
Labor Epidural      Patient reassessed immediately prior to procedure    Patient location during procedure: OB  Start Time: 8/5/2024 7:25 PM  Stop Time: 8/5/2024 7:45 PM  Performed By  CRNA/CAA: Po De La Vega CRNA  Preanesthetic Checklist  Completed: patient identified, IV checked, risks and benefits discussed, surgical consent, monitors and equipment checked, pre-op evaluation and timeout performed  Prep:  Pt Position:sitting  Sterile Tech:cap, gloves, mask and sterile barrier  Prep:povidone-iodine 7.5% surgical scrub  Monitoring:blood pressure monitoring, continuous pulse oximetry and EKG  Epidural Block Procedure:  Approach:midline  Guidance:landmark technique  Location:L4-L5  Needle Type:Tuohy  Needle Gauge:17 G  Loss of Resistance Medium: saline  Loss of Resistance: 8cm  Cath Depth at skin:13 cm  Paresthesia: none  Aspiration:negative  Test Dose:negative  Medication: fentaNYL citrate (PF) (SUBLIMAZE) injection - Epidural   100 mcg - 8/5/2024 7:43:00 PM  lidocaine 1.5%-EPINEPHrine 1:200,000 (XYLOCAINE W/EPI) injection - Epidural   3 mL - 8/5/2024 7:40:00 PM   2 mL - 8/5/2024 7:43:00 PM  Number of Attempts: 1  Post Assessment:  Dressing:occlusive dressing applied and secured with tape  Pt Tolerance:patient tolerated the procedure well with no apparent complications  Complications:no

## 2024-08-05 NOTE — PROGRESS NOTES
"Post Delivery EARLY Follow up (1-2weeks)        Chief Complaint   Patient presents with    Postpartum Care     Early      Date of delivery: 2024  Delivery type:   LTCS  Feeding: Breast    HPI:     HA:  no  Vision changes:  yes, no change, same floaters, no scotomata  RUQ/epigastric pain:  no  Swelling:  yes, LE only    Pain:  no  Vaginal Bleeding:  yes, light    Depressed/Anxious:  no, history of, on Zoloft  EPDS score: 3   #10: 0    Weight at last OB OV: 166lbs     Discharge Summary by Jeanine Garcia DO (2024 08:42)    GHTN, started on Procardia 30 XL twice daily postpartum   with uterine atony, 2 units packed red blood cells    PHYSICAL EXAM:  BP (!) 148/105   Pulse 99   Ht 152.4 cm (60\")   Wt 67.4 kg (148 lb 9.6 oz)   LMP 2023   Breastfeeding Yes   BMI 29.02 kg/m²  17.2 kg (38 lb)   Repeat 142/100  General- NAD, alert and oriented, appropriate  Psych- Normal mood, good memory, good eye contact   INCISION : Clean, dry, intact, no evidence of infection  Abdomen- Soft, non distended, non tender, no masses  Extremities- +2 edema, bilaterally equal, no signs of DVT    ASSESSMENT AND PLAN:  Diagnoses and all orders for this visit:    1. Gestational hypertension, third trimester (Primary)  Comments:  Now postpartum, elev BP PP.  Change to Procardia 90XL  Orders:  -     CBC & Differential  -     Comprehensive Metabolic Panel    Other orders  -     NIFEdipine XL (PROCARDIA XL) 90 MG 24 hr tablet; Take 1 tablet by mouth Daily.  Dispense: 30 tablet; Refill: 0      Counseling:   Return to office for HA unrelieved w rest/hydration/OTC meds, vision changes, increase in edema, RUQ/epigastric pain, any concerns.  Check BPs at home.  Return to office or L+D if after hours for systolic >155 OR diastolic >105.  Postpartum/Postop  precautions reviewed.  Incisional care reviewed, keep clean and dry.      Follow Up:  Return in about 1 week (around 2024) for BP check .            Jeanine " DO Radha  08/12/2024    [facility] [place] [facility]  [address] [place] [address]  Dept: [phone]  Dept Fax: [phone]  Loc: [phone]  Loc Fax: [phone]

## 2024-08-05 NOTE — H&P
[facility]  Obstetric History and Physical    No chief complaint on file.      Subjective     PNC provided by:  ABDOUL    HPI:    Patient is a 23 y.o. female  currently at 36w5d, who presents with LOF since Friday, cont trickle not a lge gush;  occ ctx;  no vb;  good FM.    Her prenatal care is complicated by  hypertension  gestational hypertension.  Her previous obstetric/gynecological history is noted for is non-contributory.    The following portions of the patients history were reviewed and updated as appropriate:   current medications, allergies, past medical history, past surgical history, past family history, past social history and current problem list.     Prenatal Information:  Prenatal Results       Initial Prenatal Labs       Test Value Reference Range Date Time    Hemoglobin  13.3 g/dL 12.0 - 15.9 24 1733       12.8 g/dL 12.0 - 15.9 24 1437       13.0 g/dL 12.0 - 15.9 23 2103    Hematocrit  40.0 % 34.0 - 46.6 24 1733       38.5 % 34.0 - 46.6 24 1437       39.5 % 34.0 - 46.6 23 2103    Platelets  326 10*3/mm3 140 - 450 24 1733       309 10*3/mm3 140 - 450 24 1437       352 10*3/mm3 140 - 450 23 2103    Rubella IgG  <0.90 index Immune >0.99 24 1437    Hepatitis B SAg  Non-Reactive  Non-Reactive 24 1437    Hepatitis C Ab  Non-Reactive  Non-Reactive 24 1437    RPR  Non Reactive  Non Reactive 24 1400    T. Pallidum Ab   Non-Reactive  Non-Reactive 24 1437    ABO  O   24 1437    Rh  Positive   24 1437    Antibody Screen  Negative   24 1437    HIV  Non-Reactive  Non-Reactive 24 1437    Urine Culture  >100,000 CFU/mL Lactobacillus species   24 0922       No growth   24 1734       50,000 CFU/mL Normal Urogenital Shreya   24 1416    Gonorrhea  Not Detected  Not Detected  24 1416    Chlamydia  Not Detected  Not Detected  24 1416    TSH  1.610 uIU/mL 0.270 - 4.200 24 0907     HgB A1c         Varicella IgG        Hemoglobinopathy Fractionation        Hemoglobinopathy (genetic testing)        Cystic fibrosis                   Fetal testing        Test Value Reference Range Date Time    NIPT        MSAFP  *Screen Negative*   04/01/24 0910    AFP-4                  2nd and 3rd Trimester       Test Value Reference Range Date Time    Hemoglobin (repeated)  12.0 g/dL 12.0 - 15.9 08/05/24 1051       10.1 g/dL 12.0 - 15.9 05/13/24 0949    Hematocrit (repeated)  36.4 % 34.0 - 46.6 08/05/24 1051       30.1 % 34.0 - 46.6 05/13/24 0949    Platelets   285 10*3/mm3 140 - 450 08/05/24 1051       271 10*3/mm3 140 - 450 05/13/24 0949       326 10*3/mm3 140 - 450 02/08/24 1733       309 10*3/mm3 140 - 450 02/05/24 1437       352 10*3/mm3 140 - 450 12/31/23 2103    1 hour GTT   141 mg/dL 65 - 139 05/13/24 0949    Antibody Screen (repeated)        3rd TM syphilis scrn (repeated)  RPR   Non Reactive  Non Reactive 07/05/24 1400    3rd TM syphilis scrn (repeated) TP-Ab        3rd TM syphilis screen TB-Ab (FTA)        Syphilis cascade test TP-Ab (EIA)        Syphilis cascade TPPA        GTT Fasting  87 mg/dL 65 - 94 05/21/24 0810    GTT 1 Hr  166 mg/dL 65 - 179 05/21/24 0907    GTT 2 Hr  137 mg/dL 65 - 154 05/21/24 1015    GTT 3 Hr  95 mg/dL 65 - 139 05/21/24 1106    Group B Strep                  Other testing        Test Value Reference Range Date Time    Parvo IgG         CMV IgG                   Drug Screening       Test Value Reference Range Date Time    Amphetamine Screen        Barbiturate Screen  Negative  Negative 02/05/24 1416    Benzodiazepine Screen  Negative  Negative 02/05/24 1416    Methadone Screen  Negative  Negative 02/05/24 1416    Phencyclidine Screen        Opiates Screen  Negative  Negative 02/05/24 1416    THC Screen  Negative  Negative 02/05/24 1416    Cocaine Screen  Negative  Negative 02/05/24 1416    Propoxyphene Screen        Buprenorphine Screen        Methamphetamine Screen         Oxycodone Screen  Negative  Negative 02/05/24 1416    Tricyclic Antidepressants Screen                  Legend    ^: Historical                          External Prenatal Results       Pregnancy Outside Results - Transcribed From Office Records - See Scanned Records For Details       Test Value Date Time    ABO  O  02/05/24 1437    Rh  Positive  02/05/24 1437    Antibody Screen  Negative  02/05/24 1437    Varicella IgG       Rubella  <0.90 index 02/05/24 1437    Hgb  12.0 g/dL 08/05/24 1051       10.1 g/dL 05/13/24 0949       13.3 g/dL 02/08/24 1733       12.8 g/dL 02/05/24 1437       13.0 g/dL 12/31/23 2103    Hct  36.4 % 08/05/24 1051       30.1 % 05/13/24 0949       40.0 % 02/08/24 1733       38.5 % 02/05/24 1437       39.5 % 12/31/23 2103    HgB A1c        1h GTT  141 mg/dL 05/13/24 0949    3h GTT Fasting  87 mg/dL 05/21/24 0810    3h GTT 1 hour  166 mg/dL 05/21/24 0907    3h GTT 2 hour  137 mg/dL 05/21/24 1015    3h GTT 3 hour   95 mg/dL 05/21/24 1106    Gonorrhea (discrete)  Not Detected  02/05/24 1416    Chlamydia (discrete)  Not Detected  02/05/24 1416    RPR  Non Reactive  07/05/24 1400    Syphils cascade: TP-Ab (FTA)  Non-Reactive  02/05/24 1437    TP-Ab       TP-Ab (EIA)       TPPA       HBsAg  Non-Reactive  02/05/24 1437    Herpes Simplex Virus PCR       Herpes Simplex VIrus Culture       HIV  Non-Reactive  02/05/24 1437    Hep C RNA Quant PCR       Hep C Antibody  Non-Reactive  02/05/24 1437    AFP  49.7 ng/mL 04/01/24 0910    NIPT       Cystic Fibroisis        Group B Strep       GBS Susceptibility to Clindamycin       GBS Susceptibility to Erythromycin       Fetal Fibronectin       Genetic Testing, Maternal Blood                 Drug Screening       Test Value Date Time    Urine Drug Screen       Amphetamine Screen       Barbiturate Screen  Negative  02/05/24 1416    Benzodiazepine Screen  Negative  02/05/24 1416    Methadone Screen  Negative  02/05/24 1416    Phencyclidine Screen       Opiates  Screen  Negative  24 1416    THC Screen  Negative  24 1416    Cocaine Screen       Propoxyphene Screen       Buprenorphine Screen       Methamphetamine Screen       Oxycodone Screen  Negative  24 1416    Tricyclic Antidepressants Screen                 Legend    ^: Historical                             Problem List:     Patient Active Problem List   Diagnosis    Other cerebral palsy    Anxiety    Supervision of other normal pregnancy, antepartum    Rubella non-immune status, antepartum    Maternal anemia in pregnancy, antepartum    Nephrolithiasis    Elevated blood pressure reading    Polyhydramnios in third trimester    Normal labor        Past OB History:     OB History    Para Term  AB Living   1 0 0 0 0 0   SAB IAB Ectopic Molar Multiple Live Births   0 0 0 0 0 0      # Outcome Date GA Lbr Larry/2nd Weight Sex Type Anes PTL Lv   1 Current                Past Medical History: Past Medical History:   Diagnosis Date    Anxiety     Kidney stone     several since a teen    Migraine withOUT aura     Ureterolithiasis       Past Surgical History Past Surgical History:   Procedure Laterality Date    KIDNEY STONE SURGERY      stent, w removal, lithotripsy      Family History: Family History   Problem Relation Age of Onset    Breast cancer Maternal Aunt         Mat. Great Great Aunt    Colon cancer Maternal Aunt         Mat. Great Great Aunt    Kidney cancer Maternal Uncle         Mat. Great Great Uncle    Lung cancer Maternal Uncle         Mat Great Great Uncle    Breast cancer Maternal Cousin         3rd Cousin    Thyroid disease Neg Hx     Ovarian cancer Neg Hx     Uterine cancer Neg Hx     Pulmonary embolism Neg Hx     Deep vein thrombosis Neg Hx       Social History:  reports that she has never smoked. She has never been exposed to tobacco smoke. She has never used smokeless tobacco.   reports no history of alcohol use.   reports no history of drug use.        General ROS: Pertinent  items are noted in HPI        Home Medications:  Prenatal Multi +DHA, famotidine, ferrous sulfate, pantoprazole, and sertraline    Allergies:  No Known Allergies    Objective       Vital Signs Range for the last 24 hours  Temperature:     Temp Source:     BP: BP: (130-145)/() 134/92   Pulse: Heart Rate:  [90] 90   Respirations: Resp:  [18] 18   SPO2:       Physical Examination:   General appearance - alert, well appearing, and in no distress  Mental status - alert, oriented to person, place, and time  Abdomen - soft, nontender, nondistended,   Pelvic - normal external genitalia, vulva, vagina, cervix, and uterus   Back exam - full range of motion, no tenderness or pain on motion  Neurological - alert, oriented, normal speech  Extremities - peripheral pulses normal  Skin - normal coloration and turgor, no suspicious skin lesions noted    Presentation: vtx   Cervix:  Per Dr. Garcia   Dilation:  3   Effacement:  80   Station:         Fetal Heart Rate Assessment :  140s, R, GLTV, cat 1  Method:     Beats/min:     Baseline:     Variability:     Accels:     Decels:     Tracing Category:       Uterine Assessment :  occ  Method:     Frequency (min):     Ctx Count in 10 min:     Duration:     Intensity:     [place] Units:       Lab Results (last 24 hours)       Procedure Component Value Units Date/Time    POC Urinalysis Dipstick [[id number]] Collected: 08/05/24 0925    Specimen: Urine Updated: 08/05/24 0926     Glucose, UA Negative mg/dL      Protein, POC Negative mg/dL     Group B Strep (Molecular) - Swab, Vaginal/Rectum [[id number]] Collected: 08/05/24 0953    Specimen: Swab from Vaginal/Rectum Updated: 08/05/24 0953    Rapid Assay For ROM - Amniotic Fluid, Amniotic Sac [[id number]]  (Abnormal) Collected: 08/05/24 1051    Specimen: Amniotic Fluid from Amniotic Sac Updated: 08/05/24 1112     Rupture of Membranes Positive    Narrative:      This test detects tiny amounts of amniotic fluid and is  approved for use at any  "gestational age. When there is   significant presence of blood on the swab, the test can   malfunction and is not recommended (possible false positive  results). In cases of only trace amounts of blood on the swab,  the test still functions properly and will not interfere with  the test results. In very rare cases when a sample is taken  12 hours or later after a rupture, a false negative result may  occur due to obstruction of the rupture by fetus or resealing  of the amniotic sac.    CBC (No Diff) [[id number]]  (Abnormal) Collected: 08/05/24 1051    Specimen: Blood Updated: 08/05/24 1102     WBC 11.23 10*3/mm3      RBC 3.99 10*6/mm3      Hemoglobin 12.0 g/dL      Hematocrit 36.4 %      MCV 91.2 fL      MCH 30.1 pg      MCHC 33.0 g/dL      RDW 13.9 %      RDW-SD 46.7 fl      MPV 10.5 fL      Platelets 285 10*3/mm3     Comprehensive Metabolic Panel [[id number]] Collected: 08/05/24 1051    Specimen: Blood Updated: 08/05/24 1058    Protein / Creatinine Ratio, Urine - Urine, Clean Catch [[id number]] Collected: 08/05/24 1052    Specimen: Urine, Clean Catch Updated: 08/05/24 1057             GBS is pending     Assessment & Plan       Normal labor  PPROM      Assessment:  1.  Intrauterine pregnancy at 36w5d gestation with reactive fetal status.    2.  PPROM  without chorioamnionitis  3.  Obstetrical history significant for is non-contributory.  4.  GBS status: No results found for: \"STREPGPB\"    Plan:  1. fetal and uterine monitoring  continuously, labor augmentation  Pitocin, analgesia with  epidural, and antibiotic for GBS  2.  Plan of care has been reviewed with patient and patient agrees.   3.  Risks, benefits of treatment plan have been discussed.  4.  All questions have been answered.        Electronically signed by Jessica Shelby MD, 08/05/24, 11:27 AM EDT.    "

## 2024-08-06 LAB
ALBUMIN SERPL-MCNC: 2.4 G/DL (ref 3.5–5.2)
ALBUMIN SERPL-MCNC: 2.5 G/DL (ref 3.5–5.2)
ALBUMIN/GLOB SERPL: 1 G/DL
ALBUMIN/GLOB SERPL: 1 G/DL
ALP SERPL-CCNC: 175 U/L (ref 39–117)
ALP SERPL-CCNC: 189 U/L (ref 39–117)
ALT SERPL W P-5'-P-CCNC: 16 U/L (ref 1–33)
ALT SERPL W P-5'-P-CCNC: 17 U/L (ref 1–33)
AMPHET+METHAMPHET UR QL: NEGATIVE
ANION GAP SERPL CALCULATED.3IONS-SCNC: 10.9 MMOL/L (ref 5–15)
ANION GAP SERPL CALCULATED.3IONS-SCNC: 13.5 MMOL/L (ref 5–15)
APTT PPP: 22.8 SECONDS (ref 24.2–34.2)
APTT PPP: 26.2 SECONDS (ref 24.2–34.2)
AST SERPL-CCNC: 35 U/L (ref 1–32)
AST SERPL-CCNC: 41 U/L (ref 1–32)
ATMOSPHERIC PRESS: 742.3 MMHG
ATMOSPHERIC PRESS: 743.1 MMHG
BARBITURATES UR QL SCN: NEGATIVE
BASE EXCESS BLDCOA CALC-SCNC: -2.5 MMOL/L (ref -2–2)
BASE EXCESS BLDCOV CALC-SCNC: -2.9 MMOL/L (ref -30–30)
BENZODIAZ UR QL SCN: NEGATIVE
BH BB BLOOD EXPIRATION DATE: NORMAL
BH BB BLOOD TYPE BARCODE: 8400
BH BB DISPENSE STATUS: NORMAL
BH BB PRODUCT CODE: NORMAL
BH BB UNIT NUMBER: NORMAL
BILIRUB SERPL-MCNC: 0.5 MG/DL (ref 0–1.2)
BILIRUB SERPL-MCNC: 0.6 MG/DL (ref 0–1.2)
BUN SERPL-MCNC: 3 MG/DL (ref 6–20)
BUN SERPL-MCNC: 4 MG/DL (ref 6–20)
BUN/CREAT SERPL: 4.6 (ref 7–25)
BUN/CREAT SERPL: 5.2 (ref 7–25)
CALCIUM SPEC-SCNC: 8 MG/DL (ref 8.6–10.5)
CALCIUM SPEC-SCNC: 8.2 MG/DL (ref 8.6–10.5)
CANNABINOIDS SERPL QL: NEGATIVE
CHLORIDE SERPL-SCNC: 105 MMOL/L (ref 98–107)
CHLORIDE SERPL-SCNC: 108 MMOL/L (ref 98–107)
CO2 SERPL-SCNC: 18.5 MMOL/L (ref 22–29)
CO2 SERPL-SCNC: 21.1 MMOL/L (ref 22–29)
COCAINE UR QL: NEGATIVE
CREAT SERPL-MCNC: 0.65 MG/DL (ref 0.57–1)
CREAT SERPL-MCNC: 0.77 MG/DL (ref 0.57–1)
DEPRECATED RDW RBC AUTO: 45.6 FL (ref 37–54)
DEPRECATED RDW RBC AUTO: 47.6 FL (ref 37–54)
EGFRCR SERPLBLD CKD-EPI 2021: 112.7 ML/MIN/1.73
EGFRCR SERPLBLD CKD-EPI 2021: 128.6 ML/MIN/1.73
ERYTHROCYTE [DISTWIDTH] IN BLOOD BY AUTOMATED COUNT: 13.8 % (ref 12.3–15.4)
ERYTHROCYTE [DISTWIDTH] IN BLOOD BY AUTOMATED COUNT: 14 % (ref 12.3–15.4)
FENTANYL UR-MCNC: NEGATIVE NG/ML
FIBRINOGEN PPP-MCNC: 481 MG/DL (ref 215–521)
FIBRINOGEN PPP-MCNC: 485 MG/DL (ref 215–521)
GLOBULIN UR ELPH-MCNC: 2.4 GM/DL
GLOBULIN UR ELPH-MCNC: 2.6 GM/DL
GLUCOSE SERPL-MCNC: 104 MG/DL (ref 65–99)
GLUCOSE SERPL-MCNC: 81 MG/DL (ref 65–99)
GROUP B STREP, DNA: NEGATIVE
HCO3 BLDCOA-SCNC: 24.9 MMOL/L
HCO3 BLDCOV-SCNC: 22.3 MMOL/L
HCT VFR BLD AUTO: 33.8 % (ref 34–46.6)
HCT VFR BLD AUTO: 37.9 % (ref 34–46.6)
HGB BLD-MCNC: 11 G/DL (ref 12–15.9)
HGB BLD-MCNC: 12.6 G/DL (ref 12–15.9)
INR PPP: 0.96 (ref 0.86–1.15)
INR PPP: 0.97 (ref 0.86–1.15)
MCH RBC QN AUTO: 30.1 PG (ref 26.6–33)
MCH RBC QN AUTO: 30.4 PG (ref 26.6–33)
MCHC RBC AUTO-ENTMCNC: 32.5 G/DL (ref 31.5–35.7)
MCHC RBC AUTO-ENTMCNC: 33.2 G/DL (ref 31.5–35.7)
MCV RBC AUTO: 90.5 FL (ref 79–97)
MCV RBC AUTO: 93.4 FL (ref 79–97)
METHADONE UR QL SCN: NEGATIVE
OPIATES UR QL: NEGATIVE
OXYCODONE UR QL SCN: NEGATIVE
PCO2 BLDCOA: 51.1 MMHG (ref 33–49)
PCO2 BLDCOV: 39.5 MM HG (ref 35–51.3)
PH BLDCOA: 7.3 PH UNITS (ref 7.18–7.34)
PH BLDCOV: 7.36 PH UNITS (ref 7.26–7.4)
PLATELET # BLD AUTO: 205 10*3/MM3 (ref 140–450)
PLATELET # BLD AUTO: 229 10*3/MM3 (ref 140–450)
PMV BLD AUTO: 10.4 FL (ref 6–12)
PMV BLD AUTO: 10.7 FL (ref 6–12)
PO2 BLDCOA: 17.6 MMHG
PO2 BLDCOV: 30.4 MM HG (ref 19–39)
POTASSIUM SERPL-SCNC: 3.7 MMOL/L (ref 3.5–5.2)
POTASSIUM SERPL-SCNC: 4.2 MMOL/L (ref 3.5–5.2)
PROT SERPL-MCNC: 4.8 G/DL (ref 6–8.5)
PROT SERPL-MCNC: 5.1 G/DL (ref 6–8.5)
PROTHROMBIN TIME: 12.9 SECONDS (ref 11.8–14.9)
PROTHROMBIN TIME: 13 SECONDS (ref 11.8–14.9)
RBC # BLD AUTO: 3.62 10*6/MM3 (ref 3.77–5.28)
RBC # BLD AUTO: 4.19 10*6/MM3 (ref 3.77–5.28)
SAO2 % BLDCOA: 21 %
SAO2 % BLDCOV: 55.8 %
SODIUM SERPL-SCNC: 137 MMOL/L (ref 136–145)
SODIUM SERPL-SCNC: 140 MMOL/L (ref 136–145)
UNIT  ABO: NORMAL
UNIT  RH: NORMAL
WBC NRBC COR # BLD AUTO: 17.29 10*3/MM3 (ref 3.4–10.8)
WBC NRBC COR # BLD AUTO: 22.1 10*3/MM3 (ref 3.4–10.8)

## 2024-08-06 PROCEDURE — 85027 COMPLETE CBC AUTOMATED: CPT | Performed by: OBSTETRICS & GYNECOLOGY

## 2024-08-06 PROCEDURE — 25010000002 FENTANYL CITRATE (PF) 50 MCG/ML SOLUTION: Performed by: NURSE ANESTHETIST, CERTIFIED REGISTERED

## 2024-08-06 PROCEDURE — 25810000003 SODIUM CHLORIDE 0.9 % SOLUTION: Performed by: OBSTETRICS & GYNECOLOGY

## 2024-08-06 PROCEDURE — 25810000003 LACTATED RINGERS PER 1000 ML: Performed by: OBSTETRICS & GYNECOLOGY

## 2024-08-06 PROCEDURE — 25010000002 MORPHINE PER 10 MG: Performed by: NURSE ANESTHETIST, CERTIFIED REGISTERED

## 2024-08-06 PROCEDURE — P9012 CRYOPRECIPITATE EACH UNIT: HCPCS

## 2024-08-06 PROCEDURE — P9016 RBC LEUKOCYTES REDUCED: HCPCS

## 2024-08-06 PROCEDURE — 80053 COMPREHEN METABOLIC PANEL: CPT | Performed by: OBSTETRICS & GYNECOLOGY

## 2024-08-06 PROCEDURE — 85384 FIBRINOGEN ACTIVITY: CPT | Performed by: OBSTETRICS & GYNECOLOGY

## 2024-08-06 PROCEDURE — 25010000002 BUPIVACAINE (PF) 0.25 % SOLUTION: Performed by: NURSE ANESTHETIST, CERTIFIED REGISTERED

## 2024-08-06 PROCEDURE — 25010000002 CEFAZOLIN PER 500 MG: Performed by: OBSTETRICS & GYNECOLOGY

## 2024-08-06 PROCEDURE — 25010000002 HYDROMORPHONE 1 MG/ML SOLUTION: Performed by: OBSTETRICS & GYNECOLOGY

## 2024-08-06 PROCEDURE — 0W3R7ZZ CONTROL BLEEDING IN GENITOURINARY TRACT, VIA NATURAL OR ARTIFICIAL OPENING: ICD-10-PCS | Performed by: OBSTETRICS & GYNECOLOGY

## 2024-08-06 PROCEDURE — 25810000003 LACTATED RINGERS SOLUTION: Performed by: OBSTETRICS & GYNECOLOGY

## 2024-08-06 PROCEDURE — 86927 PLASMA FRESH FROZEN: CPT

## 2024-08-06 PROCEDURE — 59515 CESAREAN DELIVERY: CPT | Performed by: OBSTETRICS & GYNECOLOGY

## 2024-08-06 PROCEDURE — 82803 BLOOD GASES ANY COMBINATION: CPT

## 2024-08-06 PROCEDURE — 86900 BLOOD TYPING SEROLOGIC ABO: CPT

## 2024-08-06 PROCEDURE — 25810000003 LACTATED RINGERS SOLUTION: Performed by: NURSE ANESTHETIST, CERTIFIED REGISTERED

## 2024-08-06 PROCEDURE — 25810000003 SODIUM CHLORIDE 0.9 % SOLUTION 500 ML FLEX CONT: Performed by: OBSTETRICS & GYNECOLOGY

## 2024-08-06 PROCEDURE — 36430 TRANSFUSION BLD/BLD COMPNT: CPT

## 2024-08-06 PROCEDURE — 25010000002 ONDANSETRON PER 1 MG: Performed by: OBSTETRICS & GYNECOLOGY

## 2024-08-06 PROCEDURE — 0503F POSTPARTUM CARE VISIT: CPT | Performed by: OBSTETRICS & GYNECOLOGY

## 2024-08-06 PROCEDURE — 25010000002 PENICILLIN G POTASSIUM PER 600000 UNITS: Performed by: OBSTETRICS & GYNECOLOGY

## 2024-08-06 PROCEDURE — 85730 THROMBOPLASTIN TIME PARTIAL: CPT | Performed by: OBSTETRICS & GYNECOLOGY

## 2024-08-06 PROCEDURE — 86923 COMPATIBILITY TEST ELECTRIC: CPT

## 2024-08-06 PROCEDURE — 25010000002 AZITHROMYCIN PER 500 MG: Performed by: OBSTETRICS & GYNECOLOGY

## 2024-08-06 PROCEDURE — 25010000002 MIDAZOLAM PER 1MG: Performed by: NURSE ANESTHETIST, CERTIFIED REGISTERED

## 2024-08-06 PROCEDURE — 85610 PROTHROMBIN TIME: CPT | Performed by: OBSTETRICS & GYNECOLOGY

## 2024-08-06 RX ORDER — MISOPROSTOL 200 UG/1
200 TABLET ORAL EVERY 4 HOURS
Status: DISCONTINUED | OUTPATIENT
Start: 2024-08-06 | End: 2024-08-07

## 2024-08-06 RX ORDER — OXYCODONE HYDROCHLORIDE 5 MG/1
5 TABLET ORAL EVERY 4 HOURS PRN
Status: DISCONTINUED | OUTPATIENT
Start: 2024-08-06 | End: 2024-08-09 | Stop reason: HOSPADM

## 2024-08-06 RX ORDER — DIPHENHYDRAMINE HYDROCHLORIDE 50 MG/ML
12.5 INJECTION INTRAMUSCULAR; INTRAVENOUS EVERY 6 HOURS PRN
Status: ACTIVE | OUTPATIENT
Start: 2024-08-06 | End: 2024-08-07

## 2024-08-06 RX ORDER — OXYCODONE HYDROCHLORIDE 5 MG/1
10 TABLET ORAL EVERY 4 HOURS PRN
Status: DISCONTINUED | OUTPATIENT
Start: 2024-08-06 | End: 2024-08-09 | Stop reason: HOSPADM

## 2024-08-06 RX ORDER — MORPHINE SULFATE 0.5 MG/ML
INJECTION, SOLUTION EPIDURAL; INTRATHECAL; INTRAVENOUS AS NEEDED
Status: DISCONTINUED | OUTPATIENT
Start: 2024-08-06 | End: 2024-08-06 | Stop reason: SURG

## 2024-08-06 RX ORDER — CALCIUM CARBONATE 500 MG/1
1 TABLET, CHEWABLE ORAL EVERY 4 HOURS PRN
Status: DISCONTINUED | OUTPATIENT
Start: 2024-08-06 | End: 2024-08-09 | Stop reason: HOSPADM

## 2024-08-06 RX ORDER — HYDROCORTISONE 25 MG/G
CREAM TOPICAL 3 TIMES DAILY PRN
Status: DISCONTINUED | OUTPATIENT
Start: 2024-08-06 | End: 2024-08-09 | Stop reason: HOSPADM

## 2024-08-06 RX ORDER — METHYLERGONOVINE MALEATE 0.2 MG/ML
200 INJECTION INTRAVENOUS
Status: DISCONTINUED | OUTPATIENT
Start: 2024-08-06 | End: 2024-08-06

## 2024-08-06 RX ORDER — MORPHINE SULFATE 0.5 MG/ML
2 INJECTION, SOLUTION EPIDURAL; INTRATHECAL; INTRAVENOUS EVERY 4 HOURS PRN
Status: DISCONTINUED | OUTPATIENT
Start: 2024-08-06 | End: 2024-08-07

## 2024-08-06 RX ORDER — DIPHENHYDRAMINE HCL 25 MG
25 CAPSULE ORAL EVERY 6 HOURS PRN
Status: ACTIVE | OUTPATIENT
Start: 2024-08-06 | End: 2024-08-07

## 2024-08-06 RX ORDER — MISOPROSTOL 200 UG/1
600 TABLET ORAL ONCE AS NEEDED
Status: DISCONTINUED | OUTPATIENT
Start: 2024-08-06 | End: 2024-08-06

## 2024-08-06 RX ORDER — SODIUM CHLORIDE 9 MG/ML
40 INJECTION, SOLUTION INTRAVENOUS AS NEEDED
Status: DISCONTINUED | OUTPATIENT
Start: 2024-08-06 | End: 2024-08-08

## 2024-08-06 RX ORDER — SERTRALINE HYDROCHLORIDE 25 MG/1
25 TABLET, FILM COATED ORAL DAILY
Status: DISCONTINUED | OUTPATIENT
Start: 2024-08-06 | End: 2024-08-09 | Stop reason: HOSPADM

## 2024-08-06 RX ORDER — ONDANSETRON 4 MG/1
4 TABLET, ORALLY DISINTEGRATING ORAL EVERY 6 HOURS PRN
Status: DISCONTINUED | OUTPATIENT
Start: 2024-08-06 | End: 2024-08-09 | Stop reason: HOSPADM

## 2024-08-06 RX ORDER — FENTANYL CITRATE 50 UG/ML
INJECTION, SOLUTION INTRAMUSCULAR; INTRAVENOUS
Status: COMPLETED
Start: 2024-08-06 | End: 2024-08-06

## 2024-08-06 RX ORDER — KETAMINE HCL IN NACL, ISO-OSM 100MG/10ML
SYRINGE (ML) INJECTION AS NEEDED
Status: DISCONTINUED | OUTPATIENT
Start: 2024-08-06 | End: 2024-08-06 | Stop reason: SURG

## 2024-08-06 RX ORDER — ACETAMINOPHEN 325 MG/1
650 TABLET ORAL EVERY 6 HOURS
Status: DISCONTINUED | OUTPATIENT
Start: 2024-08-07 | End: 2024-08-09 | Stop reason: HOSPADM

## 2024-08-06 RX ORDER — NALOXONE HCL 0.4 MG/ML
0.4 VIAL (ML) INJECTION ONCE AS NEEDED
Status: ACTIVE | OUTPATIENT
Start: 2024-08-06 | End: 2024-08-07

## 2024-08-06 RX ORDER — HYDROMORPHONE HYDROCHLORIDE 2 MG/ML
0.5 INJECTION, SOLUTION INTRAMUSCULAR; INTRAVENOUS; SUBCUTANEOUS
Status: DISCONTINUED | OUTPATIENT
Start: 2024-08-06 | End: 2024-08-06

## 2024-08-06 RX ORDER — HYDROXYZINE HYDROCHLORIDE 25 MG/1
50 TABLET, FILM COATED ORAL EVERY 6 HOURS PRN
Status: DISCONTINUED | OUTPATIENT
Start: 2024-08-06 | End: 2024-08-09 | Stop reason: HOSPADM

## 2024-08-06 RX ORDER — SODIUM CHLORIDE, SODIUM LACTATE, POTASSIUM CHLORIDE, CALCIUM CHLORIDE 600; 310; 30; 20 MG/100ML; MG/100ML; MG/100ML; MG/100ML
125 INJECTION, SOLUTION INTRAVENOUS CONTINUOUS
Status: DISCONTINUED | OUTPATIENT
Start: 2024-08-06 | End: 2024-08-07

## 2024-08-06 RX ORDER — AMOXICILLIN 250 MG
1 CAPSULE ORAL 2 TIMES DAILY PRN
Status: DISCONTINUED | OUTPATIENT
Start: 2024-08-06 | End: 2024-08-09 | Stop reason: HOSPADM

## 2024-08-06 RX ORDER — MIDAZOLAM HYDROCHLORIDE 2 MG/2ML
INJECTION, SOLUTION INTRAMUSCULAR; INTRAVENOUS AS NEEDED
Status: DISCONTINUED | OUTPATIENT
Start: 2024-08-06 | End: 2024-08-06 | Stop reason: SURG

## 2024-08-06 RX ORDER — ACETAMINOPHEN 500 MG
1000 TABLET ORAL EVERY 6 HOURS
Status: COMPLETED | OUTPATIENT
Start: 2024-08-06 | End: 2024-08-07

## 2024-08-06 RX ORDER — MISOPROSTOL 100 UG/1
TABLET ORAL AS NEEDED
Status: DISCONTINUED | OUTPATIENT
Start: 2024-08-06 | End: 2024-08-09 | Stop reason: HOSPADM

## 2024-08-06 RX ORDER — MEPERIDINE HYDROCHLORIDE 25 MG/ML
12.5 INJECTION INTRAMUSCULAR; INTRAVENOUS; SUBCUTANEOUS
Status: DISCONTINUED | OUTPATIENT
Start: 2024-08-06 | End: 2024-08-06

## 2024-08-06 RX ORDER — ONDANSETRON 2 MG/ML
4 INJECTION INTRAMUSCULAR; INTRAVENOUS EVERY 6 HOURS PRN
Status: DISCONTINUED | OUTPATIENT
Start: 2024-08-06 | End: 2024-08-09 | Stop reason: HOSPADM

## 2024-08-06 RX ORDER — CARBOPROST TROMETHAMINE 250 UG/ML
INJECTION, SOLUTION INTRAMUSCULAR AS NEEDED
Status: DISCONTINUED | OUTPATIENT
Start: 2024-08-06 | End: 2024-08-09 | Stop reason: HOSPADM

## 2024-08-06 RX ORDER — NALOXONE HCL 0.4 MG/ML
0.4 VIAL (ML) INJECTION
Status: DISCONTINUED | OUTPATIENT
Start: 2024-08-06 | End: 2024-08-09 | Stop reason: HOSPADM

## 2024-08-06 RX ORDER — SODIUM CHLORIDE 0.9 % (FLUSH) 0.9 %
10 SYRINGE (ML) INJECTION AS NEEDED
Status: DISCONTINUED | OUTPATIENT
Start: 2024-08-06 | End: 2024-08-08

## 2024-08-06 RX ORDER — LIDOCAINE HCL/EPINEPHRINE/PF 2%-1:200K
VIAL (ML) INJECTION AS NEEDED
Status: DISCONTINUED | OUTPATIENT
Start: 2024-08-06 | End: 2024-08-06 | Stop reason: SURG

## 2024-08-06 RX ORDER — PROMETHAZINE HYDROCHLORIDE 25 MG/1
12.5 TABLET ORAL EVERY 4 HOURS PRN
Status: DISCONTINUED | OUTPATIENT
Start: 2024-08-06 | End: 2024-08-09 | Stop reason: HOSPADM

## 2024-08-06 RX ORDER — HYDROMORPHONE HYDROCHLORIDE 2 MG/ML
0.25 INJECTION, SOLUTION INTRAMUSCULAR; INTRAVENOUS; SUBCUTANEOUS
Status: ACTIVE | OUTPATIENT
Start: 2024-08-06 | End: 2024-08-07

## 2024-08-06 RX ORDER — DOCUSATE SODIUM 100 MG/1
100 CAPSULE, LIQUID FILLED ORAL DAILY
Status: DISCONTINUED | OUTPATIENT
Start: 2024-08-06 | End: 2024-08-09 | Stop reason: HOSPADM

## 2024-08-06 RX ORDER — SODIUM CHLORIDE 0.9 % (FLUSH) 0.9 %
10 SYRINGE (ML) INJECTION EVERY 12 HOURS SCHEDULED
Status: DISCONTINUED | OUTPATIENT
Start: 2024-08-06 | End: 2024-08-08

## 2024-08-06 RX ORDER — SIMETHICONE 80 MG
80 TABLET,CHEWABLE ORAL 4 TIMES DAILY PRN
Status: DISCONTINUED | OUTPATIENT
Start: 2024-08-06 | End: 2024-08-09 | Stop reason: HOSPADM

## 2024-08-06 RX ORDER — HYDROMORPHONE HYDROCHLORIDE 2 MG/ML
0.25 INJECTION, SOLUTION INTRAMUSCULAR; INTRAVENOUS; SUBCUTANEOUS
Status: DISCONTINUED | OUTPATIENT
Start: 2024-08-06 | End: 2024-08-06

## 2024-08-06 RX ORDER — CARBOPROST TROMETHAMINE 250 UG/ML
250 INJECTION, SOLUTION INTRAMUSCULAR
Status: DISCONTINUED | OUTPATIENT
Start: 2024-08-06 | End: 2024-08-06

## 2024-08-06 RX ORDER — MAGNESIUM CARB/ALUMINUM HYDROX 105-160MG
30 TABLET,CHEWABLE ORAL DAILY PRN
Status: DISCONTINUED | OUTPATIENT
Start: 2024-08-06 | End: 2024-08-06

## 2024-08-06 RX ORDER — DIPHENOXYLATE HYDROCHLORIDE AND ATROPINE SULFATE 2.5; .025 MG/1; MG/1
1 TABLET ORAL EVERY 6 HOURS PRN
Status: DISCONTINUED | OUTPATIENT
Start: 2024-08-06 | End: 2024-08-08

## 2024-08-06 RX ORDER — TRANEXAMIC ACID 10 MG/ML
INJECTION, SOLUTION INTRAVENOUS
Status: DISPENSED
Start: 2024-08-06 | End: 2024-08-06

## 2024-08-06 RX ORDER — PHENYLEPHRINE HCL IN 0.9% NACL 1 MG/10 ML
SYRINGE (ML) INTRAVENOUS AS NEEDED
Status: DISCONTINUED | OUTPATIENT
Start: 2024-08-06 | End: 2024-08-06 | Stop reason: SURG

## 2024-08-06 RX ORDER — ALUMINA, MAGNESIA, AND SIMETHICONE 2400; 2400; 240 MG/30ML; MG/30ML; MG/30ML
15 SUSPENSION ORAL EVERY 4 HOURS PRN
Status: DISCONTINUED | OUTPATIENT
Start: 2024-08-06 | End: 2024-08-09 | Stop reason: HOSPADM

## 2024-08-06 RX ADMIN — OXYCODONE HYDROCHLORIDE 10 MG: 5 TABLET ORAL at 23:30

## 2024-08-06 RX ADMIN — SODIUM CHLORIDE, POTASSIUM CHLORIDE, SODIUM LACTATE AND CALCIUM CHLORIDE: 600; 310; 30; 20 INJECTION, SOLUTION INTRAVENOUS at 08:45

## 2024-08-06 RX ADMIN — SODIUM CHLORIDE, POTASSIUM CHLORIDE, SODIUM LACTATE AND CALCIUM CHLORIDE 900 ML: 600; 310; 30; 20 INJECTION, SOLUTION INTRAVENOUS at 10:11

## 2024-08-06 RX ADMIN — HYDROMORPHONE HYDROCHLORIDE 0.5 MG: 1 INJECTION, SOLUTION INTRAMUSCULAR; INTRAVENOUS; SUBCUTANEOUS at 11:01

## 2024-08-06 RX ADMIN — SODIUM CHLORIDE, POTASSIUM CHLORIDE, SODIUM LACTATE AND CALCIUM CHLORIDE 125 ML/HR: 600; 310; 30; 20 INJECTION, SOLUTION INTRAVENOUS at 01:44

## 2024-08-06 RX ADMIN — PENICILLIN G POTASSIUM 2.5 MILLION UNITS: 5000000 INJECTION, POWDER, FOR SOLUTION INTRAMUSCULAR; INTRAVENOUS at 03:39

## 2024-08-06 RX ADMIN — ACETAMINOPHEN 1000 MG: 500 TABLET ORAL at 16:06

## 2024-08-06 RX ADMIN — AZITHROMYCIN DIHYDRATE 500 MG: 500 INJECTION, POWDER, LYOPHILIZED, FOR SOLUTION INTRAVENOUS at 08:37

## 2024-08-06 RX ADMIN — SODIUM CHLORIDE, POTASSIUM CHLORIDE, SODIUM LACTATE AND CALCIUM CHLORIDE 1000 ML: 600; 310; 30; 20 INJECTION, SOLUTION INTRAVENOUS at 08:37

## 2024-08-06 RX ADMIN — HYDROMORPHONE HYDROCHLORIDE 0.5 MG: 1 INJECTION, SOLUTION INTRAMUSCULAR; INTRAVENOUS; SUBCUTANEOUS at 13:15

## 2024-08-06 RX ADMIN — ACETAMINOPHEN 1000 MG: 500 TABLET ORAL at 21:28

## 2024-08-06 RX ADMIN — Medication 200 MCG: at 09:23

## 2024-08-06 RX ADMIN — SODIUM CHLORIDE 2000 MG: 9 INJECTION, SOLUTION INTRAVENOUS at 16:06

## 2024-08-06 RX ADMIN — LIDOCAINE HYDROCHLORIDE,EPINEPHRINE BITARTRATE 5 ML: 20; .005 INJECTION, SOLUTION EPIDURAL; INFILTRATION; INTRACAUDAL; PERINEURAL at 09:52

## 2024-08-06 RX ADMIN — FENTANYL CITRATE 100 MCG: 50 INJECTION, SOLUTION INTRAMUSCULAR; INTRAVENOUS at 05:58

## 2024-08-06 RX ADMIN — OXYCODONE 5 MG: 5 TABLET ORAL at 14:28

## 2024-08-06 RX ADMIN — Medication 10 ML/HR: at 02:52

## 2024-08-06 RX ADMIN — BUPIVACAINE HYDROCHLORIDE 4 ML: 2.5 INJECTION, SOLUTION EPIDURAL; INFILTRATION; INTRACAUDAL; PERINEURAL at 05:58

## 2024-08-06 RX ADMIN — Medication 200 MCG: at 09:34

## 2024-08-06 RX ADMIN — ONDANSETRON 4 MG: 2 INJECTION INTRAMUSCULAR; INTRAVENOUS at 09:15

## 2024-08-06 RX ADMIN — Medication 200 MCG: at 09:20

## 2024-08-06 RX ADMIN — MIDAZOLAM HYDROCHLORIDE 2 MG: 1 INJECTION, SOLUTION INTRAMUSCULAR; INTRAVENOUS at 09:50

## 2024-08-06 RX ADMIN — SODIUM CHLORIDE 2000 MG: 9 INJECTION, SOLUTION INTRAVENOUS at 08:37

## 2024-08-06 RX ADMIN — OXYCODONE 5 MG: 5 TABLET ORAL at 18:40

## 2024-08-06 RX ADMIN — MORPHINE SULFATE 2 MG: 0.5 INJECTION, SOLUTION EPIDURAL; INTRATHECAL; INTRAVENOUS at 10:31

## 2024-08-06 RX ADMIN — MISOPROSTOL 200 MCG: 200 TABLET ORAL at 16:09

## 2024-08-06 RX ADMIN — MISOPROSTOL 200 MCG: 200 TABLET ORAL at 23:26

## 2024-08-06 RX ADMIN — MISOPROSTOL 200 MCG: 200 TABLET ORAL at 20:04

## 2024-08-06 RX ADMIN — Medication 20 MG: at 09:50

## 2024-08-06 RX ADMIN — SODIUM CHLORIDE 2000 MG: 9 INJECTION, SOLUTION INTRAVENOUS at 23:26

## 2024-08-06 RX ADMIN — Medication 200 MCG: at 09:38

## 2024-08-06 RX ADMIN — SODIUM CHLORIDE 150 ML/HR: 9 INJECTION, SOLUTION INTRAVENOUS at 09:45

## 2024-08-06 RX ADMIN — LIDOCAINE HYDROCHLORIDE,EPINEPHRINE BITARTRATE 10 ML: 20; .005 INJECTION, SOLUTION EPIDURAL; INFILTRATION; INTRACAUDAL; PERINEURAL at 08:47

## 2024-08-06 NOTE — DISCHARGE INSTRUCTIONS
DR. MORROW'S POSTOP  DISCHARGE PRECAUTIONS and Answers to FAQs     NO SEX or tampons for SIX weeks.     NO DRIVING for TWO weeks. or while taking narcotic pain medications.     NO TUB BATH or POOL for FOUR weeks, shower only.       NO LIFTING more than 20 pounds for 2 week(s).     STAPLES (if present):  follow up at the office in the next 3-7 days to have them removed if they were not removed before discharge.  If your staples were removed already and steri-strips placed (or you just had steri-strips) REMOVE YOUR STERI STRIPS in TEN DAYS.      INCISION CARE:   WASH DAILY in the shower with soap and water (any type of soap is fine, it does not need to be antibacterial soap).  Look (or have a family member/friend look) at your incision EVERY DAY when you get home.  Keeping the incision DRY is extremely important.  Continue to keep a clean, dry wash cloth (to help wick away moisture) on your incision for 10 days.  Change out the wash cloth frequently (approximately every 2-8 hrs as needed to prevent it from getting moist).  REDNESS, PUS, increase in PAIN, FEVER or CHILLS are all reasons to be seen in our office immediately.  Go to the ER, if it is after hours or a weekend.         VAGINAL BLEEDING:  may continue on and off over the next several weeks after delivery and may increase slightly once you go home.  You should not be bleeding more than 1 large pad soaked every hour or two.  Clots (even the size of a lemon or larger) may be normal as long as the bleeding is not heavy and the clots do not continue.       FEVER or CHILLS or NOT FEELING WELL: call our office.  If the office is closed, you need to be seen in acute care or ER.       CHEST PAIN or SHORTNESS OF BREATH/AIR: you need to GO TO THE NEAREST ER or CALL 911.      SWELLING:  can increase over the next 7-10 days and then should slowly improve.  Your legs/ankles should be fairly similar in size.  A red, painful, hot, swollen leg (usually just one side)  can be a sign of a blood clot and should be evaluated immediately.  Call our office.  If it is after hours or a weekend, you must be seen IMMEDIATELY IN THE ER.      ELEVATED BLOOD PRESSURE:  you need to contact us if you are having  persistent elevated BP systolic (top number) more than 155 or diastolic (bottom number) more than 95, or a headache (not relieved with rest, hydration or over the counter pain reliever), an increase in your swelling (usually hands and face), changes in your vision (typically flashing white or black spots) or severe persistent pain in the location of the upper right side of your belly (under your right breast).  Call our office or go to ER if after hours or a weekend.     LACTATION QUESTIONS or CONCERNS?  Call [facility] Lactation support [phone].     WORK and SCHOOL TIME OFF: depends on your specific delivery type, surrounding circumstances, and your work insurance/school rules.  If you have questions, please call Binta or Sary at [phone] (ext. [phone]).  Or email Binta at [email or web address].  They will assist in required paperwork for you and/or family members.      For further QUESTIONS or CONCERNS, please call [facility] DARIANA Valdez at [phone].

## 2024-08-06 NOTE — PLAN OF CARE
Goal Outcome Evaluation:  Plan of Care Reviewed With: patient, significant other        Progress: improving  Outcome Evaluation: Pt's pain has been well controlled with epidural. SVE has progressed to 9.5, with little change since 0145. IUPC in place, pitocin titrated per order. External EFM tracings WNL. Pt is coping appropriately                                normal

## 2024-08-06 NOTE — ANESTHESIA PREPROCEDURE EVALUATION
Anesthesia Evaluation     no history of anesthetic complications:   NPO Solid Status: > 8 hours  NPO Liquid Status: < 2 hours           Airway   Mallampati: II  TM distance: >3 FB  Neck ROM: full  No difficulty expected  Dental - normal exam     Pulmonary - negative pulmonary ROS and normal exam   Cardiovascular - normal exam  Exercise tolerance: good (4-7 METS)    (+) hypertension      Neuro/Psych    ROS Comment: Cerebral palsy  GI/Hepatic/Renal/Endo    (+) renal disease- stones    Musculoskeletal (-) negative ROS    Abdominal    Substance History - negative use     OB/GYN    (+) Pregnant        Other                    Anesthesia Plan    ASA 2     epidural       Anesthetic plan, risks, benefits, and alternatives have been provided, discussed and informed consent has been obtained with: patient and spouse/significant other.    CODE STATUS:    Level Of Support Discussed With: Patient  Code Status (Patient has no pulse and is not breathing): CPR (Attempt to Resuscitate)  Medical Interventions (Patient has pulse or is breathing): Full      
No

## 2024-08-06 NOTE — PROGRESS NOTES
OB Intrapartum Note    I went to check on patient after pushing, patient extremely uncomfortable.  Pushing now for over an hour.  Good pushing effort but minimal effect.  Still at +1 station, significant molding noted.  Discussed the risk, benefits, alternatives, side effects, efficacy of continued pushing which I suspect would be another hour or 2, vacuum-assisted vaginal delivery, versus .  With significant molding and patient being extremely exhausted I feel the benefits of the  outweigh the risks and that vacuum is not the best plan and either is continued pushing.      Patient and family agreed to primary .      Difficulty during pushing picking up maternal heart rate and fetal heart rate.  FSE was placed.  OR made aware.    Electronically signed by Jeanine Garcia DO, 24, 8:38 AM EDT.

## 2024-08-06 NOTE — PROGRESS NOTES
OB Intrapartum Note    Subjective: Pain NOT controlled, I arrived to check on pushing.  By report pt was AL at 145,  nursing started pushing at 0400 and noted lip still present by Smiley MARAVILLA+D nurse.  Pit was at 8 and she restarted pushing at 0515, was able to push lip back.  I arrived to check at 0545 on pushing.  Fidel was very uncomfortable and I noted a significant AL still present.  No descent w pushing.    Objective:  Baseline: Normal 110-160 bpm  Variability:   Moderate/Normal (amplitude 6-25 bpm)  Accelerations: Present (32 weeks+) 15 x 15 bpm  Decelerations: Intermittent, occas variable, difficult to determine early vs late.    Contractions:  Regular, Pitocin at 8milliunits/min- turned off at check    Cervical exam:    Dilation: AL, unable to reduce    Effacement: 100%    Station: +1    Impression:    Category II  Reassuring fetus, Lack of adequate progress, Pain not controlled, IUPC placed    Plan:   Suspect inadequate uterine contractions.  IUPC placed and discussed with patient and family.  On my initial check and placement of IUPC fetal heart rate baseline lower than prior and some difficulty picking up fetal heart rate, Pitocin stopped at that point to reevaluate.  Blood pressure was within normal limits and anesthesia present for bolus.  Will plan to restart Pitocin and recheck in 1 hour.  Discussed potential CS depending on next check.  Questions answered, they desire to proceed as above.        Electronically signed by Jeanine Garcia DO, 08/06/24, 6:09 AM EDT.

## 2024-08-06 NOTE — PLAN OF CARE
Goal Outcome Evaluation:              Outcome Evaluation: Pt required a  delivery for failure to progress.  PPH stage 3 called per Dr Garcia.  Pt recieved hemorrhage medications and 2 units of PRBCs,  Responded well to treatment and moved to labor and delivery for monitorng. Pt bleeding has improved.  Shannan removed, scant bleeding, vital signs stable.  Pain controlled with 5mg Roxicodone prn and scheduled tylenol.  Plan to move pt to PP if status remains stable.

## 2024-08-06 NOTE — DISCHARGE SUMMARY
OB Discharge Summary        Admit Date:  2024  Date of Delivery: 2024   Discharge Date: 24    Reason for Admission/Chief Complaint: Prolonged rupture of membranes    Final Diagnosis:  36+6, prolonged rupture of membranes  GHTN without severe features- Procardia PP 30XL bid  Early labor  Pitocin augmentation  Arrest of descent  Primary , severe uterine atony  Postpartum hemorrhage s/p 2U PRBC  Anxiety-Zoloft 25 mg  Anemia pregnancy-on iron  History of kidney stones-urology manages  To do at FU: Follow-up blood pressure, incision and moods    Antepartum:  Prenatal care is complicated by:  See above Final Diagnosis for list    Intrapartum/Delivery:  OB Surgeon:  Dr. Jeanine Garcia, DO  Anesthesia: Epidural  Delivery Type:  LTCS  Feeding method: Breast    Infant: female  infant; Soledad Najera    Weight: 3500 g (7 lb 11.5 oz)      APGARS: 5  @ 1 minute / 7  @ 5 minutes    Hospital Course/Significant Findings:  Fidel was seen in the office on  for her routine OB visit.  Several days prior to OV she had noted fluid in her underwear that was quarter size.  On exam nitrazine was positive and subsequent AmniSure positive.  Patient was already 3 cm dilated and guerrero.  Blood pressure was also mildly elevated, GHTN without severe features.  Labs within normal limits.  She was admitted and started on penicillin for unknown GBS.  She progressed and pushed for approximately 1 hour.  Prior to that had been an anterior lip for several hours.  Arrest of descent with significant molding, decision was not for a vacuum-assisted vaginal delivery due to concern for CPD.   was complicated by severe uterine atony unresponsive to uterotonic's or TXA.  Shannan was placed.  Patient received 2 units of packed red blood cells.  Shannan removed about 6hrs after placement and lochia appropriate.  BP mildly elev POD 1, started on Procardia 30XL and increased to BID on POD 2.  On the day of discharge POSTOP  CSECTION tolerating a regular diet, ambulating, pain well controlled, urinating spontaneously and lochia appropriate.   Vital signs were stable and afebrile.  Exam was within normal limits.  Incision was intact, well approximated without signs of infection ([facility] Wound [facility] System in place if present).  Abdomen was soft nondistended non-tender.  Fundus was below umbilicus and non-tender.  Meeting discharge criteria and desired discharge home.  Postop instructions and FU reviewed and questions answered.    Results from last 7 days   Lab Units 08/08/24  0528 08/07/24  0532 08/06/24  1214   WBC 10*3/mm3 12.22* 15.96* 22.10*   HEMOGLOBIN g/dL 12.4 10.7* 12.6   HEMATOCRIT % 37.5 32.2* 37.9   PLATELETS 10*3/mm3 225 182 229       Results from last 7 days   Lab Units 08/08/24  0528 08/06/24  1214 08/06/24  0956   GLUCOSE mg/dL 85 81 104*   CREATININE mg/dL 0.62 0.65 0.77   SODIUM mmol/L 139 140 137   POTASSIUM mmol/L 4.0 3.7 4.2   CHLORIDE mmol/L 105 108* 105   AST (SGOT) U/L 25 41* 35*   ALT (SGPT) U/L 11 16 17       Results from last 7 days   Lab Units 08/05/24  1202   TREPONEMA PALLIDUM AB TOTAL  Non-Reactive       Lab Results   Component Value Date    CREATININEUR 75.8 08/05/2024    PROTEINUR 19.9 08/05/2024    UTPCR 0.26 08/05/2024        Blood Sugar in Office          5/21/2024    08:05   Blood Sugar in Office   Glucose 76         Results from last 7 days   Lab Units 08/06/24  1214 08/06/24  0954   PROTIME Seconds 13.0 12.9   INR  0.97 0.96   APTT seconds 22.8* 26.2   FIBRINOGEN mg/dL 481 485         Lab Results   Component Value Date    FIBRINOGEN 481 08/06/2024     Results for orders placed or performed during the hospital encounter of 08/05/24   Rapid Assay For ROM - Amniotic Fluid, Amniotic Sac    Specimen: Amniotic Sac; Amniotic Fluid   Result Value Ref Range    Rupture of Membranes Positive (A) Negative   CBC (No Diff)    Specimen: Blood   Result Value Ref Range    WBC 11.23 (H) 3.40 - 10.80 10*3/mm3     RBC 3.99 3.77 - 5.28 10*6/mm3    Hemoglobin 12.0 12.0 - 15.9 g/dL    Hematocrit 36.4 34.0 - 46.6 %    MCV 91.2 79.0 - 97.0 fL    MCH 30.1 26.6 - 33.0 pg    MCHC 33.0 31.5 - 35.7 g/dL    RDW 13.9 12.3 - 15.4 %    RDW-SD 46.7 37.0 - 54.0 fl    MPV 10.5 6.0 - 12.0 fL    Platelets 285 140 - 450 10*3/mm3   Comprehensive Metabolic Panel    Specimen: Blood   Result Value Ref Range    Glucose 87 65 - 99 mg/dL    BUN 4 (L) 6 - 20 mg/dL    Creatinine 0.52 (L) 0.57 - 1.00 mg/dL    Sodium 137 136 - 145 mmol/L    Potassium 3.8 3.5 - 5.2 mmol/L    Chloride 103 98 - 107 mmol/L    CO2 20.3 (L) 22.0 - 29.0 mmol/L    Calcium 9.0 8.6 - 10.5 mg/dL    Total Protein 6.6 6.0 - 8.5 g/dL    Albumin 3.4 (L) 3.5 - 5.2 g/dL    ALT (SGPT) 15 1 - 33 U/L    AST (SGOT) 21 1 - 32 U/L    Alkaline Phosphatase 218 (H) 39 - 117 U/L    Total Bilirubin 0.3 0.0 - 1.2 mg/dL    Globulin 3.2 gm/dL    A/G Ratio 1.1 g/dL    BUN/Creatinine Ratio 7.7 7.0 - 25.0    Anion Gap 13.7 5.0 - 15.0 mmol/L    eGFR 135.8 >60.0 mL/min/1.73   Protein / Creatinine Ratio, Urine - Urine, Clean Catch    Specimen: Urine, Clean Catch   Result Value Ref Range    Creatinine, Urine 75.8 mg/dL    Total Protein, Urine 19.9 mg/dL    Protein/Creatinine Ratio, Urine 0.26    Treponema pallidum AB w/Reflex RPR    Specimen: Blood   Result Value Ref Range    Treponemal AB Total Non-Reactive Non-Reactive   Urine Drug Screen - Urine, Clean Catch    Specimen: Urine, Clean Catch   Result Value Ref Range    Amphet/Methamphet, Screen Negative Negative    Barbiturates Screen, Urine Negative Negative    Benzodiazepine Screen, Urine Negative Negative    Cocaine Screen, Urine Negative Negative    Opiate Screen Negative Negative    THC, Screen, Urine Negative Negative    Methadone Screen, Urine Negative Negative    Oxycodone Screen, Urine Negative Negative    Fentanyl, Urine Negative Negative   CBC (No Diff)    Specimen: Blood   Result Value Ref Range    WBC 17.29 (H) 3.40 - 10.80 10*3/mm3    RBC 3.62  (L) 3.77 - 5.28 10*6/mm3    Hemoglobin 11.0 (L) 12.0 - 15.9 g/dL    Hematocrit 33.8 (L) 34.0 - 46.6 %    MCV 93.4 79.0 - 97.0 fL    MCH 30.4 26.6 - 33.0 pg    MCHC 32.5 31.5 - 35.7 g/dL    RDW 14.0 12.3 - 15.4 %    RDW-SD 47.6 37.0 - 54.0 fl    MPV 10.7 6.0 - 12.0 fL    Platelets 205 140 - 450 10*3/mm3   Comprehensive Metabolic Panel    Specimen: Blood   Result Value Ref Range    Glucose 104 (H) 65 - 99 mg/dL    BUN 4 (L) 6 - 20 mg/dL    Creatinine 0.77 0.57 - 1.00 mg/dL    Sodium 137 136 - 145 mmol/L    Potassium 4.2 3.5 - 5.2 mmol/L    Chloride 105 98 - 107 mmol/L    CO2 18.5 (L) 22.0 - 29.0 mmol/L    Calcium 8.2 (L) 8.6 - 10.5 mg/dL    Total Protein 5.1 (L) 6.0 - 8.5 g/dL    Albumin 2.5 (L) 3.5 - 5.2 g/dL    ALT (SGPT) 17 1 - 33 U/L    AST (SGOT) 35 (H) 1 - 32 U/L    Alkaline Phosphatase 189 (H) 39 - 117 U/L    Total Bilirubin 0.5 0.0 - 1.2 mg/dL    Globulin 2.6 gm/dL    A/G Ratio 1.0 g/dL    BUN/Creatinine Ratio 5.2 (L) 7.0 - 25.0    Anion Gap 13.5 5.0 - 15.0 mmol/L    eGFR 112.7 >60.0 mL/min/1.73   Protime-INR    Specimen: Blood   Result Value Ref Range    Protime 12.9 11.8 - 14.9 Seconds    INR 0.96 0.86 - 1.15   aPTT    Specimen: Blood   Result Value Ref Range    PTT 26.2 24.2 - 34.2 seconds   Fibrinogen    Specimen: Blood   Result Value Ref Range    Fibrinogen 485 215 - 521 mg/dL   Blood Gas, Arterial, Cord    Specimen: Umbilical Cord; Cord Blood Arterial   Result Value Ref Range    pH, Cord Arterial 7.30 7.18 - 7.34 pH Units    pCO2, Cord Arterial 51.1 (H) 33.0 - 49.0 mmHg    pO2, Cord Arterial 17.6 mmHg    HCO3, Cord Arterial 24.9 mmol/L    Base Exc, Cord Arterial -2.5 (L) -2.0 - 2.0 mmol/L    O2 Sat, Cord Arterial 21.0 %    Barometric Pressure for Blood Gas 742.3000 mmHg   Blood Gas, Venous, Cord    Specimen: Umbilical Cord; Cord Blood Venous   Result Value Ref Range    pH, Cord Venous 7.359 7.260 - 7.400 pH Units    pCO2, Cord Venous 39.5 35.0 - 51.3 mm Hg    pO2, Cord Venous 30.4 19.0 - 39.0 mm Hg     HCO3, Cord Venous 22.3 mmol/L    Base Excess, Cord Venous -2.9 -30.0 - 30.0 mmol/L    O2 Sat, Cord Venous 55.8 %    Barometric Pressure for Blood Gas 743.1000 mmHg   CBC (No Diff)    Specimen: Blood   Result Value Ref Range    WBC 22.10 (H) 3.40 - 10.80 10*3/mm3    RBC 4.19 3.77 - 5.28 10*6/mm3    Hemoglobin 12.6 12.0 - 15.9 g/dL    Hematocrit 37.9 34.0 - 46.6 %    MCV 90.5 79.0 - 97.0 fL    MCH 30.1 26.6 - 33.0 pg    MCHC 33.2 31.5 - 35.7 g/dL    RDW 13.8 12.3 - 15.4 %    RDW-SD 45.6 37.0 - 54.0 fl    MPV 10.4 6.0 - 12.0 fL    Platelets 229 140 - 450 10*3/mm3   Comprehensive Metabolic Panel    Specimen: Blood   Result Value Ref Range    Glucose 81 65 - 99 mg/dL    BUN 3 (L) 6 - 20 mg/dL    Creatinine 0.65 0.57 - 1.00 mg/dL    Sodium 140 136 - 145 mmol/L    Potassium 3.7 3.5 - 5.2 mmol/L    Chloride 108 (H) 98 - 107 mmol/L    CO2 21.1 (L) 22.0 - 29.0 mmol/L    Calcium 8.0 (L) 8.6 - 10.5 mg/dL    Total Protein 4.8 (L) 6.0 - 8.5 g/dL    Albumin 2.4 (L) 3.5 - 5.2 g/dL    ALT (SGPT) 16 1 - 33 U/L    AST (SGOT) 41 (H) 1 - 32 U/L    Alkaline Phosphatase 175 (H) 39 - 117 U/L    Total Bilirubin 0.6 0.0 - 1.2 mg/dL    Globulin 2.4 gm/dL    A/G Ratio 1.0 g/dL    BUN/Creatinine Ratio 4.6 (L) 7.0 - 25.0    Anion Gap 10.9 5.0 - 15.0 mmol/L    eGFR 128.6 >60.0 mL/min/1.73   Fibrinogen    Specimen: Blood   Result Value Ref Range    Fibrinogen 481 215 - 521 mg/dL   Protime-INR    Specimen: Blood   Result Value Ref Range    Protime 13.0 11.8 - 14.9 Seconds    INR 0.97 0.86 - 1.15   aPTT    Specimen: Blood   Result Value Ref Range    PTT 22.8 (L) 24.2 - 34.2 seconds   CBC Auto Differential    Specimen: Blood   Result Value Ref Range    WBC 15.96 (H) 3.40 - 10.80 10*3/mm3    RBC 3.60 (L) 3.77 - 5.28 10*6/mm3    Hemoglobin 10.7 (L) 12.0 - 15.9 g/dL    Hematocrit 32.2 (L) 34.0 - 46.6 %    MCV 89.4 79.0 - 97.0 fL    MCH 29.7 26.6 - 33.0 pg    MCHC 33.2 31.5 - 35.7 g/dL    RDW 14.2 12.3 - 15.4 %    RDW-SD 46.5 37.0 - 54.0 fl    MPV 10.1  6.0 - 12.0 fL    Platelets 182 140 - 450 10*3/mm3    Neutrophil % 82.7 (H) 42.7 - 76.0 %    Lymphocyte % 9.0 (L) 19.6 - 45.3 %    Monocyte % 7.5 5.0 - 12.0 %    Eosinophil % 0.1 (L) 0.3 - 6.2 %    Basophil % 0.3 0.0 - 1.5 %    Immature Grans % 0.4 0.0 - 0.5 %    Neutrophils, Absolute 13.20 (H) 1.70 - 7.00 10*3/mm3    Lymphocytes, Absolute 1.43 0.70 - 3.10 10*3/mm3    Monocytes, Absolute 1.20 (H) 0.10 - 0.90 10*3/mm3    Eosinophils, Absolute 0.02 0.00 - 0.40 10*3/mm3    Basophils, Absolute 0.04 0.00 - 0.20 10*3/mm3    Immature Grans, Absolute 0.07 (H) 0.00 - 0.05 10*3/mm3    nRBC 0.0 0.0 - 0.2 /100 WBC   Comprehensive Metabolic Panel    Specimen: Arm, Left; Blood   Result Value Ref Range    Glucose 85 65 - 99 mg/dL    BUN 9 6 - 20 mg/dL    Creatinine 0.62 0.57 - 1.00 mg/dL    Sodium 139 136 - 145 mmol/L    Potassium 4.0 3.5 - 5.2 mmol/L    Chloride 105 98 - 107 mmol/L    CO2 21.4 (L) 22.0 - 29.0 mmol/L    Calcium 8.7 8.6 - 10.5 mg/dL    Total Protein 5.4 (L) 6.0 - 8.5 g/dL    Albumin 2.4 (L) 3.5 - 5.2 g/dL    ALT (SGPT) 11 1 - 33 U/L    AST (SGOT) 25 1 - 32 U/L    Alkaline Phosphatase 151 (H) 39 - 117 U/L    Total Bilirubin 0.2 0.0 - 1.2 mg/dL    Globulin 3.0 gm/dL    A/G Ratio 0.8 g/dL    BUN/Creatinine Ratio 14.5 7.0 - 25.0    Anion Gap 12.6 5.0 - 15.0 mmol/L    eGFR 130.1 >60.0 mL/min/1.73   CBC Auto Differential    Specimen: Arm, Left; Blood   Result Value Ref Range    WBC 12.22 (H) 3.40 - 10.80 10*3/mm3    RBC 4.14 3.77 - 5.28 10*6/mm3    Hemoglobin 12.4 12.0 - 15.9 g/dL    Hematocrit 37.5 34.0 - 46.6 %    MCV 90.6 79.0 - 97.0 fL    MCH 30.0 26.6 - 33.0 pg    MCHC 33.1 31.5 - 35.7 g/dL    RDW 14.0 12.3 - 15.4 %    RDW-SD 46.5 37.0 - 54.0 fl    MPV 10.0 6.0 - 12.0 fL    Platelets 225 140 - 450 10*3/mm3    Neutrophil % 70.6 42.7 - 76.0 %    Lymphocyte % 19.4 (L) 19.6 - 45.3 %    Monocyte % 7.4 5.0 - 12.0 %    Eosinophil % 1.9 0.3 - 6.2 %    Basophil % 0.3 0.0 - 1.5 %    Immature Grans % 0.4 0.0 - 0.5 %     Neutrophils, Absolute 8.62 (H) 1.70 - 7.00 10*3/mm3    Lymphocytes, Absolute 2.37 0.70 - 3.10 10*3/mm3    Monocytes, Absolute 0.91 (H) 0.10 - 0.90 10*3/mm3    Eosinophils, Absolute 0.23 0.00 - 0.40 10*3/mm3    Basophils, Absolute 0.04 0.00 - 0.20 10*3/mm3    Immature Grans, Absolute 0.05 0.00 - 0.05 10*3/mm3    nRBC 0.0 0.0 - 0.2 /100 WBC   Type & Screen    Specimen: Blood   Result Value Ref Range    ABO Type O     RH type Positive     Antibody Screen Negative     T&S Expiration Date 8/8/2024 11:59:59 PM    Prepare Cryoprecipitate 5 Pack   Result Value Ref Range    Product Code S7320J09     Unit Number [id number]     UNIT  ABO O     UNIT  RH POS     Dispense Status PT     Blood Expiration Date 202408061351     Blood Type Barcode 5100     Product Code M8042A86     Unit Number [id number]     UNIT  ABO O     UNIT  RH POS     Dispense Status RE     Blood Expiration Date 202408061351     Blood Type Barcode 5100    Prepare Fresh Frozen Plasma, 4 Units   Result Value Ref Range    Product Code R4023G51     Unit Number [id number]     UNIT  ABO AB     UNIT  RH POS     Dispense Status RE     Blood Expiration Date 202408112359     Blood Type Barcode 8400     Product Code U8802HG2     Unit Number [id number]     UNIT  ABO AB     UNIT  RH POS     Dispense Status RE     Blood Expiration Date 202408112359     Blood Type Barcode 8400     Product Code V9500H02     Unit Number [id number]     UNIT  ABO AB     UNIT  RH POS     Dispense Status RE     Blood Expiration Date 202408112359     Blood Type Barcode 8400     Product Code H6530V19     Unit Number [id number]     UNIT  ABO AB     UNIT  RH POS     Dispense Status RE     Blood Expiration Date 960405366761     Blood Type Barcode 8400    Prepare RBC, 4 Units   Result Value Ref Range    Product Code Z6150O45     Unit Number [id number]     UNIT  ABO O     UNIT  RH POS     Crossmatch Interpretation Compatible     Dispense Status PT     Blood Expiration  Date 202408282359     Blood Type Barcode 5100     Product Code D8157T79     Unit Number [id number]     UNIT  ABO O     UNIT  RH POS     Crossmatch Interpretation Compatible     Dispense Status PT     Blood Expiration Date 202408282359     Blood Type Barcode 5100     Product Code G9574I18     Unit Number [id number]     UNIT  ABO O     UNIT  RH POS     Crossmatch Interpretation Compatible     Dispense Status RE     Blood Expiration Date 202408282359     Blood Type Barcode 5100     Product Code C5828L74     Unit Number [id number]     UNIT  ABO O     UNIT  RH POS     Crossmatch Interpretation Compatible     Dispense Status RE     Blood Expiration Date 202408292359     Blood Type Barcode 5100        Discharge:         Discharge Medications        New Medications        Instructions Start Date   acetaminophen 325 MG tablet  Commonly known as: Tylenol   650 mg, Oral, Every 6 Hours PRN      docusate sodium 100 MG capsule  Commonly known as: Colace   100 mg, Oral, 2 Times Daily PRN      ibuprofen 600 MG tablet  Commonly known as: ADVIL,MOTRIN   600 mg, Oral, Every 6 Hours PRN      NIFEdipine XL 30 MG 24 hr tablet  Commonly known as: Procardia XL   30 mg, Oral, Daily      NIFEdipine XL 30 MG 24 hr tablet  Commonly known as: PROCARDIA XL   30 mg, Oral, 2 Times Daily      oxyCODONE 5 MG immediate release tablet  Commonly known as: ROXICODONE   2.5 mg, Oral, Every 4 Hours PRN             Continue These Medications        Instructions Start Date   ferrous sulfate 325 (65 FE) MG tablet   325 mg, Oral, Every Other Day      Prenatal Multi +DHA 27-0.8-228 MG capsule   1 capsule, Oral, Daily      sertraline 25 MG tablet  Commonly known as: ZOLOFT   25 mg, Oral, Daily             Stop These Medications      famotidine 20 MG tablet  Commonly known as: PEPCID     pantoprazole 20 MG EC tablet  Commonly known as: Protonix                Disposition: Home  Diet: Regular    Activity: Pelvic rest 6 weeks    Condition at  discharge: Good    Follow up with: Dr. Jeanine Garcia    Follow up in:  1 week blood pressure check, incision check and follow-up moods       Complications: Severe uterine atony with postpartum hemorrhage, 2U PRBC      Signature: Electronically signed by Jeanine Garcia DO, 08/09/24, 8:51 AM EDT.        [facility] AND DELIV[facility]  [address] [place] [address]  [facility] [place] [address]  Dept: [phone]  Dept Fax: [phone]  Loc: [phone]

## 2024-08-06 NOTE — PROCEDURES
OB Operative Note        Date of Service:  24     Pre-Operative Dx:   IUP at Gestational Age: 36w6d  weeks    indication: Arrest of descent    Postoperative dx:   Same as above  Uterine atony  Postpartum hemorrhage    Procedure:   Primary LTCS    Surgeon:  Jeanine Garcia DO    Assistant:  Dr. Norman Pagan OBGYN Hospitalist was responsible for performing the following activities:   Retraction, Suction, Irrigation, Fundal pressure to assist with delivery of the fetus, Closure of the right side of the fascia and their skilled assistance was necessary for the success of this case.     No resident assist available.    Anesthesia:  Epidural  CRNA: Keith Dolan, FERCHO; Dr. Hook    Estimated Blood Loss: 1800 mls    Findings:   Normal tubes, Normal ovaries, Normal placenta, centrally inserting cord, extremely atonic uterus despite uterotonic's.  Shannan placed    Infant: Gender:  female  infant   Weight:   3500 g (7 lb 11.5 oz)    APGARS:  5  @ 1 minute / 7  @ 5 minutes    Specimens:    Cord blood, cord gases     Procedure Details:  The patient was brought to the operating room and epidural anesthesia was deemed to be adequate.   A vaginal prep was performed.  Fetal pillow was placed as per guidelines.  She was prepped and draped in a normal sterile fashion and placed in supine position with a leftward tilt.  A Pfannenstiel skin incision was made approximately 2 fingerbreadths above the symphysis pubis and carried down to the underlying layer of fascia.  The fascia was nicked in the midline and extended laterally with Whitehead scissors.  The superior and inferior aspects of the fascial incision were grasped with Kocher clamps and the underlying rectus muscle was dissected off bluntly.  The midline was identified and opened in a sharp fashion with no noted damage to underlying bowel, bladder, blood vessels, or organs.  The vesicouterine peritoneum was incised and the bladder flap was created.  The lower uterine segment  was incised in a transverse fashion and extended laterally with bandage scissors.  Fluid was noted to be clear and copious.  The fetal vertex was brought up atraumatically through the uterine incision.  The mouth and nares were bulb suctioned.  There was no nuchal cord.  The left anterior and right posterior shoulders were delivered easily.  The remainder of the body delivered.  Delivery was at 0903.  The infant was vigorous.  The cord was clamped and cut after a delay of 60 seconds and the infant was handed off to [facility] baby care.  A segment of cord was handed off to the technician for collection of cord blood and cord gases.  The placenta delivered with the assistance of fundal massage and was discarded.  The uterus was exteriorized and cleared of all clots and debris.  The uterine incision was repaired with 0 Vicryl in a running fashion.  A second imbricating stitch was placed.  Excellent hemostasis was assured.      Uterine atony was evident after delivery of the infant.  Required pressors from anesthesia due to hypotension, please see separate anesthesia notes.  Pitocin IV.  Cytotec 200 mcg p.o. and 200 mcg sublingual was given at 0904.  TXA was given at 0906.  Patient has gestational hypertension and therefore Methergine was not used.  Hemabate given IM at 0907.   Pit 10U Intramyometrial.  Uterine atony persisted and was significant.  Decision for was for Shannan instead of B- hernández stitch.  Shannan was placed without incident at 0922 and all vaginal bleeding stopped and uterus remained firm.  Second dose of Hemabate given intramyometrial at 0937.  Please see anesthesia notes w difficulty in obtaining second IV or labs during hemorrhage.  Called for ultrasound at 0940 and arrived at 0945 to assist with obtaining labs and second IV.  Second anesthesiologist was called, Dr. Hook, and extra staff available in the room.  Second IV was started and labs drawn at 0946.  First unit of blood was given at 0950 and second  unit of blood at 1003.  Once CBC returned and coags returned within normal limits, no further blood transfusion given and FFP not given.     The uterus was placed back into the pelvic cavity during placement of the Shannan.  Copious irrigation was performed.  The gutters were cleared of all clot and debris.  The uterine incision was reinspected off tension and noted to be hemostatic.  The parietal peritoneum was closed.  The rectus muscles were reapproximated in the midline.  The rectus muscles and fascia were noted to be hemostatic.  The fascia was closed with 0 Vicryl in a running fashion starting at the bilateral angles and to the midline.  The subcutaneous tissue was copiously irrigated and made hemostatic.  It was reapproximated using monocryl and the skin was closed with staples.  Urine was clear at the end of the procedure, it was slightly blood-tinged at the beginning of the procedure prior to starting.     The patient tolerated the procedure well.  Instrument, lap, and needle counts were correct.  The patient received antibiotics prior to the procedure, 2 g of Ancef and secondary to hemorrhage received a second dose of 2 g of Ancef.  She was taken to the recovery room in stable condition.      Complications: Severe uterine atony with postpartum hemorrhage    Condition: Stable    Disposition:  PACU          Electronically signed by:  Jeanine Garcia DO, 08/06/24, 10:47 AM EDT.

## 2024-08-07 LAB
BASOPHILS # BLD AUTO: 0.04 10*3/MM3 (ref 0–0.2)
BASOPHILS NFR BLD AUTO: 0.3 % (ref 0–1.5)
BH BB BLOOD EXPIRATION DATE: NORMAL
BH BB BLOOD TYPE BARCODE: 5100
BH BB DISPENSE STATUS: NORMAL
BH BB PRODUCT CODE: NORMAL
BH BB UNIT NUMBER: NORMAL
CROSSMATCH INTERPRETATION: NORMAL
DEPRECATED RDW RBC AUTO: 46.5 FL (ref 37–54)
EOSINOPHIL # BLD AUTO: 0.02 10*3/MM3 (ref 0–0.4)
EOSINOPHIL NFR BLD AUTO: 0.1 % (ref 0.3–6.2)
ERYTHROCYTE [DISTWIDTH] IN BLOOD BY AUTOMATED COUNT: 14.2 % (ref 12.3–15.4)
HCT VFR BLD AUTO: 32.2 % (ref 34–46.6)
HGB BLD-MCNC: 10.7 G/DL (ref 12–15.9)
IMM GRANULOCYTES # BLD AUTO: 0.07 10*3/MM3 (ref 0–0.05)
IMM GRANULOCYTES NFR BLD AUTO: 0.4 % (ref 0–0.5)
LYMPHOCYTES # BLD AUTO: 1.43 10*3/MM3 (ref 0.7–3.1)
LYMPHOCYTES NFR BLD AUTO: 9 % (ref 19.6–45.3)
MCH RBC QN AUTO: 29.7 PG (ref 26.6–33)
MCHC RBC AUTO-ENTMCNC: 33.2 G/DL (ref 31.5–35.7)
MCV RBC AUTO: 89.4 FL (ref 79–97)
MONOCYTES # BLD AUTO: 1.2 10*3/MM3 (ref 0.1–0.9)
MONOCYTES NFR BLD AUTO: 7.5 % (ref 5–12)
NEUTROPHILS NFR BLD AUTO: 13.2 10*3/MM3 (ref 1.7–7)
NEUTROPHILS NFR BLD AUTO: 82.7 % (ref 42.7–76)
NRBC BLD AUTO-RTO: 0 /100 WBC (ref 0–0.2)
PLATELET # BLD AUTO: 182 10*3/MM3 (ref 140–450)
PMV BLD AUTO: 10.1 FL (ref 6–12)
RBC # BLD AUTO: 3.6 10*6/MM3 (ref 3.77–5.28)
UNIT  ABO: NORMAL
UNIT  RH: NORMAL
WBC NRBC COR # BLD AUTO: 15.96 10*3/MM3 (ref 3.4–10.8)

## 2024-08-07 PROCEDURE — 85025 COMPLETE CBC W/AUTO DIFF WBC: CPT | Performed by: OBSTETRICS & GYNECOLOGY

## 2024-08-07 PROCEDURE — 0503F POSTPARTUM CARE VISIT: CPT | Performed by: OBSTETRICS & GYNECOLOGY

## 2024-08-07 RX ORDER — NIFEDIPINE 30 MG/1
30 TABLET, EXTENDED RELEASE ORAL
Status: DISCONTINUED | OUTPATIENT
Start: 2024-08-07 | End: 2024-08-08

## 2024-08-07 RX ORDER — IBUPROFEN 600 MG/1
600 TABLET ORAL EVERY 6 HOURS SCHEDULED
Status: DISCONTINUED | OUTPATIENT
Start: 2024-08-07 | End: 2024-08-09 | Stop reason: HOSPADM

## 2024-08-07 RX ADMIN — ACETAMINOPHEN 1000 MG: 500 TABLET ORAL at 09:03

## 2024-08-07 RX ADMIN — CALCIUM CARBONATE 1 TABLET: 500 TABLET, CHEWABLE ORAL at 17:50

## 2024-08-07 RX ADMIN — ACETAMINOPHEN 650 MG: 325 TABLET ORAL at 21:31

## 2024-08-07 RX ADMIN — MISOPROSTOL 200 MCG: 200 TABLET ORAL at 03:45

## 2024-08-07 RX ADMIN — DOCUSATE SODIUM 100 MG: 100 CAPSULE, LIQUID FILLED ORAL at 09:04

## 2024-08-07 RX ADMIN — OXYCODONE HYDROCHLORIDE 10 MG: 5 TABLET ORAL at 11:02

## 2024-08-07 RX ADMIN — IBUPROFEN 600 MG: 600 TABLET, FILM COATED ORAL at 12:47

## 2024-08-07 RX ADMIN — IBUPROFEN 600 MG: 600 TABLET, FILM COATED ORAL at 23:33

## 2024-08-07 RX ADMIN — ACETAMINOPHEN 650 MG: 325 TABLET ORAL at 15:55

## 2024-08-07 RX ADMIN — SERTRALINE 25 MG: 25 TABLET, FILM COATED ORAL at 09:04

## 2024-08-07 RX ADMIN — IBUPROFEN 600 MG: 600 TABLET, FILM COATED ORAL at 17:49

## 2024-08-07 RX ADMIN — ACETAMINOPHEN 1000 MG: 500 TABLET ORAL at 03:44

## 2024-08-07 RX ADMIN — NIFEDIPINE 30 MG: 30 TABLET, FILM COATED, EXTENDED RELEASE ORAL at 19:56

## 2024-08-07 RX ADMIN — OXYCODONE 5 MG: 5 TABLET ORAL at 07:21

## 2024-08-07 NOTE — PROGRESS NOTES
PostPartum/PostOp PROGRESS NOTE        Subjective:  Patient has no complaints  Pain controlled  Tolerating clears  Passing flatus  Ambulating  Urinating spontaneously  Lochia decreasing, no bleeding concerns  Denies HA, vision change, or RUQ/epigastric pain  No lightheadedness or dizziness    Objective:     Temp:  [98.1 °F (36.7 °C)-99.9 °F (37.7 °C)] 99.1 °F (37.3 °C)  Heart Rate:  [] 107  Resp:  [14-18] 16  BP: (116-171)/() 129/74  General- NAD, alert and oriented, appropriate  Psych- Normal mood, good memory  Cardiovascular/Respiratory- CV- Regular rhythm, no murnurs, Resp- CTA to bases, no wheezes  Abdomen- Fundus firm, non tender, Soft, non distended, non tender, normal active bowel sounds, Bandage intact, and Fundus at U  Extremties/DTRs- +1 edema, bilaterally equal, no signs of DVT    Results from last 7 days   Lab Units 08/07/24  0532 08/06/24  1214 08/06/24  0938   WBC 10*3/mm3 15.96* 22.10* 17.29*   HEMOGLOBIN g/dL 10.7* 12.6 11.0*   HEMATOCRIT % 32.2* 37.9 33.8*   PLATELETS 10*3/mm3 182 229 205       Results from last 7 days   Lab Units 08/06/24  1214 08/06/24  0956 08/05/24  1051   GLUCOSE mg/dL 81 104* 87   CREATININE mg/dL 0.65 0.77 0.52*   SODIUM mmol/L 140 137 137   POTASSIUM mmol/L 3.7 4.2 3.8   CHLORIDE mmol/L 108* 105 103   AST (SGOT) U/L 41* 35* 21   ALT (SGPT) U/L 16 17 15       Blood Sugar in Office          5/21/2024    08:05   Blood Sugar in Office   Glucose 76         Results from last 7 days   Lab Units 08/05/24  1202   TREPONEMA PALLIDUM AB TOTAL  Non-Reactive     Assessment:    Post-partum/postop Day:  1  The patient is currently breastfeeding.    PPH, atony, s/p 2uPRBC, resolved    Mild transaminitis    Plan:   Routine postpartum/postop care  Remove IV  Remove bandage  Shower  PO pain meds  Importance of wound care/keep clean and dry  Remove staples on day of DC, place steris  Breast feeding support  CMP tomorrow and CBC  Diet adv to regular  Dc  ancef              Electronically signed by Jeanine Garcia DO, 08/07/24, 7:39 AM EDT.

## 2024-08-07 NOTE — ANESTHESIA POSTPROCEDURE EVALUATION
Patient: Fidel NEWTON    Procedure Summary       Date: 24 Room / Location: [facility] LABOR DELIVERY  [facility] LABOR DELIVERY    Anesthesia Start:  Anesthesia Stop: 24    Procedure:  SECTION PRIMARY (Abdomen) Diagnosis: (Failure to progress)    Surgeons: Jeanine Garcia DO Provider: Po De La Vega CRNA    Anesthesia Type: epidural ASA Status: 2            Anesthesia Type: epidural    Vitals  Vitals Value Taken Time   /85 24 0935   Temp 36.9 °C (98.4 °F) 24 0935   Pulse 89 24 0935   Resp 18 24 0935   SpO2 95 % 24   Vitals shown include unfiled device data.        Post Anesthesia Care and Evaluation    Patient location during evaluation: bedside  Patient participation: complete - patient participated  Level of consciousness: awake  Pain score: 7  Pain management: adequate    Airway patency: patent  Anesthetic complications: No anesthetic complications  PONV Status: controlled  Cardiovascular status: acceptable and stable  Respiratory status: acceptable  Hydration status: acceptable  Post Neuraxial Block status: Motor and sensory function returned to baseline and No signs or symptoms of PDPH

## 2024-08-07 NOTE — PLAN OF CARE
Problem: Bleeding ( Delivery)  Goal: Bleeding is Controlled  Outcome: Met   Goal Outcome Evaluation:

## 2024-08-07 NOTE — LACTATION NOTE
LC in to assist with this first breastfeeding session. Infant was delayed to the breast because of maternal medical issues. Baby wakened easily and placed in laid back position. LC latched for this patient and baby got onto the breast shallow at first then easily moved in deeper on her own. Good swallows seen on both breasts. Patient states she is very motivated to breastfeed. LC discussed with patient about  normal  breastfeeding behaviors and breastfeeding expectations for the next 2 days and to call as needed for lactation assistance . Patient showed good understanding.

## 2024-08-07 NOTE — PLAN OF CARE
Problem: Adult Inpatient Plan of Care  Goal: Plan of Care Review  8/7/2024 0541 by Danyelle Jean RN  Outcome: Ongoing, Progressing  Flowsheets (Taken 8/7/2024 0541)  Progress: improving  Plan of Care Reviewed With: patient  8/7/2024 0538 by Danyelle Jean RN  Outcome: Ongoing, Progressing  Flowsheets (Taken 8/7/2024 0538)  Progress: improving  Plan of Care Reviewed With: patient  Goal: Patient-Specific Goal (Individualized)  8/7/2024 0541 by Danyelle Jean RN  Outcome: Ongoing, Progressing  8/7/2024 0538 by Danyelle Jean RN  Outcome: Ongoing, Progressing  Goal: Absence of Hospital-Acquired Illness or Injury  8/7/2024 0541 by Danyelle Jean RN  Outcome: Ongoing, Progressing  8/7/2024 0538 by Danyelle Jean RN  Outcome: Ongoing, Progressing  Intervention: Identify and Manage Fall Risk  Recent Flowsheet Documentation  Taken 8/7/2024 0535 by Danyelle Jean RN  Safety Promotion/Fall Prevention: safety round/check completed  Taken 8/7/2024 0200 by Danyelle Jean RN  Safety Promotion/Fall Prevention: safety round/check completed  Taken 8/7/2024 0100 by Danyelle Jean RN  Safety Promotion/Fall Prevention: safety round/check completed  Taken 8/7/2024 0000 by Danyelle Jean RN  Safety Promotion/Fall Prevention: safety round/check completed  Taken 8/6/2024 2215 by Danyelle Jean RN  Safety Promotion/Fall Prevention: safety round/check completed  Taken 8/6/2024 2130 by Danyelle Jean RN  Safety Promotion/Fall Prevention: safety round/check completed  Intervention: Prevent Skin Injury  Recent Flowsheet Documentation  Taken 8/6/2024 2130 by Danyelle Jean RN  Body Position: position changed independently  Intervention: Prevent and Manage VTE (Venous Thromboembolism) Risk  Recent Flowsheet Documentation  Taken 8/7/2024 0345 by Danyelle Jean RN  Activity Management: ambulated to bathroom  Taken 8/7/2024 0005 by Danyelle Jean RN  Activity Management: up in chair  Taken 8/6/2024 2130 by Danyelle Jean RN  VTE Prevention/Management:   sequential compression  devices on   bilateral  Goal: Optimal Comfort and Wellbeing  2024 0541 by Danyelle Jean RN  Outcome: Ongoing, Progressing  2024 0538 by Danyelle Jean RN  Outcome: Ongoing, Progressing  Intervention: Monitor Pain and Promote Comfort  Recent Flowsheet Documentation  Taken 2024 2330 by Danyelle Jean RN  Pain Management Interventions: see MAR  Taken 2024 by Danyelle Jean RN  Pain Management Interventions: see MAR  Intervention: Provide Person-Centered Care  Recent Flowsheet Documentation  Taken 2024 by Danyelle Jean RN  Trust Relationship/Rapport:   care explained   choices provided  Goal: Readiness for Transition of Care  2024 0541 by Danyelle Jean RN  Outcome: Ongoing, Progressing  2024 05 by Danyelle Jean RN  Outcome: Ongoing, Progressing     Problem: Skin Injury Risk Increased  Goal: Skin Health and Integrity  2024 05 by Danyelle Jean RN  Outcome: Ongoing, Progressing  2024 05 by Danyelle Jean RN  Outcome: Ongoing, Progressing     Problem: Change in Fetal Wellbeing ( Delivery)  Goal: Stable Fetal Wellbeing  2024 0541 by Danyelle Jean RN  Outcome: Ongoing, Progressing  2024 05 by Danyelle Jean RN  Outcome: Ongoing, Progressing  Intervention: Promote and Monitor Fetal Wellbeing  Recent Flowsheet Documentation  Taken 2024 by Danyelle Jean RN  Body Position: position changed independently     Problem: Infection ( Delivery)  Goal: Absence of Infection Signs and Symptoms  2024 0541 by Danyelle Jean RN  Outcome: Ongoing, Progressing  2024 05 by Danyelle Jean RN  Outcome: Ongoing, Progressing     Problem: Respiratory Compromise ( Delivery)  Goal: Effective Oxygenation and Ventilation  2024 0541 by Danyelle Jean RN  Outcome: Ongoing, Progressing  2024 05 by Danyelle Jean RN  Outcome: Ongoing, Progressing     Problem: Adjustment to Role Transition (Postpartum  Delivery)  Goal: Successful Maternal Role  Transition  2024 05 by Danyelle Jean RN  Outcome: Ongoing, Progressing  2024 05 by Danyelle Jean RN  Outcome: Ongoing, Progressing     Problem: Bleeding (Postpartum  Delivery)  Goal: Hemostasis  2024 0541 by Danyelle Jean RN  Outcome: Ongoing, Progressing  2024 by Danyelle Jean RN  Outcome: Ongoing, Progressing     Problem: Infection (Postpartum  Delivery)  Goal: Absence of Infection Signs and Symptoms  2024 0541 by Danyelle Jean RN  Outcome: Ongoing, Progressing  2024 by Danyelle Jean RN  Outcome: Ongoing, Progressing     Problem: Pain (Postpartum  Delivery)  Goal: Acceptable Pain Control  2024 by Danyelle Jean RN  Outcome: Ongoing, Progressing  2024 by Danyelle Jean RN  Outcome: Ongoing, Progressing  Intervention: Prevent or Manage Pain  Recent Flowsheet Documentation  Taken 2024 2330 by Danyelle Jean RN  Pain Management Interventions: see MAR  Taken 2024 2130 by Danyelle Jean RN  Pain Management Interventions: see MAR     Problem: Postoperative Nausea and Vomiting (Postpartum  Delivery)  Goal: Nausea and Vomiting Relief  2024 by Danyelle Jean RN  Outcome: Ongoing, Progressing  2024 by Danyelle Jean RN  Outcome: Ongoing, Progressing     Problem: Postoperative Urinary Retention (Postpartum  Delivery)  Goal: Effective Urinary Elimination  2024 by Danyelle Jean RN  Outcome: Ongoing, Progressing  2024 by Danyelle Jean RN  Outcome: Ongoing, Progressing   Goal Outcome Evaluation:  Plan of Care Reviewed With: patient        Progress: improving

## 2024-08-07 NOTE — LACTATION NOTE
LC in to assist with this feeding. Patient does have more weakness on her left side of her body per her report. Baby wakened easily and latched well to the right side and fed with good swallows for 15 minutes and no pain. She was sleepier on the left breast and LC needed to assist with keeping her awake and feeding. Less swallows seen on this side but did feed for 15 minutes. She has no nipple soreness.

## 2024-08-08 LAB
ALBUMIN SERPL-MCNC: 2.4 G/DL (ref 3.5–5.2)
ALBUMIN/GLOB SERPL: 0.8 G/DL
ALP SERPL-CCNC: 151 U/L (ref 39–117)
ALT SERPL W P-5'-P-CCNC: 11 U/L (ref 1–33)
ANION GAP SERPL CALCULATED.3IONS-SCNC: 12.6 MMOL/L (ref 5–15)
AST SERPL-CCNC: 25 U/L (ref 1–32)
BASOPHILS # BLD AUTO: 0.04 10*3/MM3 (ref 0–0.2)
BASOPHILS NFR BLD AUTO: 0.3 % (ref 0–1.5)
BILIRUB SERPL-MCNC: 0.2 MG/DL (ref 0–1.2)
BUN SERPL-MCNC: 9 MG/DL (ref 6–20)
BUN/CREAT SERPL: 14.5 (ref 7–25)
CALCIUM SPEC-SCNC: 8.7 MG/DL (ref 8.6–10.5)
CHLORIDE SERPL-SCNC: 105 MMOL/L (ref 98–107)
CO2 SERPL-SCNC: 21.4 MMOL/L (ref 22–29)
CREAT SERPL-MCNC: 0.62 MG/DL (ref 0.57–1)
DEPRECATED RDW RBC AUTO: 46.5 FL (ref 37–54)
EGFRCR SERPLBLD CKD-EPI 2021: 130.1 ML/MIN/1.73
EOSINOPHIL # BLD AUTO: 0.23 10*3/MM3 (ref 0–0.4)
EOSINOPHIL NFR BLD AUTO: 1.9 % (ref 0.3–6.2)
ERYTHROCYTE [DISTWIDTH] IN BLOOD BY AUTOMATED COUNT: 14 % (ref 12.3–15.4)
GLOBULIN UR ELPH-MCNC: 3 GM/DL
GLUCOSE SERPL-MCNC: 85 MG/DL (ref 65–99)
HCT VFR BLD AUTO: 37.5 % (ref 34–46.6)
HGB BLD-MCNC: 12.4 G/DL (ref 12–15.9)
IMM GRANULOCYTES # BLD AUTO: 0.05 10*3/MM3 (ref 0–0.05)
IMM GRANULOCYTES NFR BLD AUTO: 0.4 % (ref 0–0.5)
LYMPHOCYTES # BLD AUTO: 2.37 10*3/MM3 (ref 0.7–3.1)
LYMPHOCYTES NFR BLD AUTO: 19.4 % (ref 19.6–45.3)
MCH RBC QN AUTO: 30 PG (ref 26.6–33)
MCHC RBC AUTO-ENTMCNC: 33.1 G/DL (ref 31.5–35.7)
MCV RBC AUTO: 90.6 FL (ref 79–97)
MONOCYTES # BLD AUTO: 0.91 10*3/MM3 (ref 0.1–0.9)
MONOCYTES NFR BLD AUTO: 7.4 % (ref 5–12)
NEUTROPHILS NFR BLD AUTO: 70.6 % (ref 42.7–76)
NEUTROPHILS NFR BLD AUTO: 8.62 10*3/MM3 (ref 1.7–7)
NRBC BLD AUTO-RTO: 0 /100 WBC (ref 0–0.2)
PLATELET # BLD AUTO: 225 10*3/MM3 (ref 140–450)
PMV BLD AUTO: 10 FL (ref 6–12)
POTASSIUM SERPL-SCNC: 4 MMOL/L (ref 3.5–5.2)
PROT SERPL-MCNC: 5.4 G/DL (ref 6–8.5)
RBC # BLD AUTO: 4.14 10*6/MM3 (ref 3.77–5.28)
SODIUM SERPL-SCNC: 139 MMOL/L (ref 136–145)
WBC NRBC COR # BLD AUTO: 12.22 10*3/MM3 (ref 3.4–10.8)

## 2024-08-08 PROCEDURE — 80053 COMPREHEN METABOLIC PANEL: CPT | Performed by: OBSTETRICS & GYNECOLOGY

## 2024-08-08 PROCEDURE — 85025 COMPLETE CBC W/AUTO DIFF WBC: CPT | Performed by: OBSTETRICS & GYNECOLOGY

## 2024-08-08 PROCEDURE — 0503F POSTPARTUM CARE VISIT: CPT | Performed by: OBSTETRICS & GYNECOLOGY

## 2024-08-08 RX ORDER — DOCUSATE SODIUM 100 MG/1
100 CAPSULE, LIQUID FILLED ORAL 2 TIMES DAILY PRN
Qty: 60 CAPSULE | Refills: 0 | Status: SHIPPED | OUTPATIENT
Start: 2024-08-08

## 2024-08-08 RX ORDER — NIFEDIPINE 30 MG/1
30 TABLET, EXTENDED RELEASE ORAL EVERY 12 HOURS
Status: DISCONTINUED | OUTPATIENT
Start: 2024-08-08 | End: 2024-08-09 | Stop reason: HOSPADM

## 2024-08-08 RX ORDER — IBUPROFEN 600 MG/1
600 TABLET ORAL EVERY 6 HOURS PRN
Qty: 30 TABLET | Refills: 0 | Status: SHIPPED | OUTPATIENT
Start: 2024-08-08

## 2024-08-08 RX ORDER — ACETAMINOPHEN 325 MG/1
650 TABLET ORAL EVERY 6 HOURS PRN
Qty: 30 TABLET | Refills: 1 | Status: SHIPPED | OUTPATIENT
Start: 2024-08-08 | End: 2024-08-14 | Stop reason: SDUPTHER

## 2024-08-08 RX ORDER — NIFEDIPINE 30 MG/1
30 TABLET, EXTENDED RELEASE ORAL DAILY
Qty: 30 TABLET | Refills: 0 | Status: SHIPPED | OUTPATIENT
Start: 2024-08-08 | End: 2024-08-12

## 2024-08-08 RX ADMIN — ACETAMINOPHEN 650 MG: 325 TABLET ORAL at 16:59

## 2024-08-08 RX ADMIN — SERTRALINE 25 MG: 25 TABLET, FILM COATED ORAL at 09:24

## 2024-08-08 RX ADMIN — IBUPROFEN 600 MG: 600 TABLET, FILM COATED ORAL at 05:12

## 2024-08-08 RX ADMIN — NIFEDIPINE 30 MG: 30 TABLET, FILM COATED, EXTENDED RELEASE ORAL at 21:04

## 2024-08-08 RX ADMIN — NIFEDIPINE 30 MG: 30 TABLET, FILM COATED, EXTENDED RELEASE ORAL at 09:24

## 2024-08-08 RX ADMIN — ACETAMINOPHEN 650 MG: 325 TABLET ORAL at 09:24

## 2024-08-08 RX ADMIN — IBUPROFEN 600 MG: 600 TABLET, FILM COATED ORAL at 17:59

## 2024-08-08 RX ADMIN — DOCUSATE SODIUM 100 MG: 100 CAPSULE, LIQUID FILLED ORAL at 09:24

## 2024-08-08 RX ADMIN — ACETAMINOPHEN 650 MG: 325 TABLET ORAL at 03:10

## 2024-08-08 RX ADMIN — ACETAMINOPHEN 650 MG: 325 TABLET ORAL at 21:04

## 2024-08-08 RX ADMIN — IBUPROFEN 600 MG: 600 TABLET, FILM COATED ORAL at 11:59

## 2024-08-08 NOTE — PLAN OF CARE
Problem: Adult Inpatient Plan of Care  Goal: Plan of Care Review  Outcome: Ongoing, Progressing  Goal: Patient-Specific Goal (Individualized)  Outcome: Ongoing, Progressing  Goal: Absence of Hospital-Acquired Illness or Injury  Outcome: Ongoing, Progressing  Intervention: Identify and Manage Fall Risk  Recent Flowsheet Documentation  Taken 8/8/2024 0400 by Sapna Dean RN  Safety Promotion/Fall Prevention: safety round/check completed  Taken 8/8/2024 0300 by Sapna Dean RN  Safety Promotion/Fall Prevention: safety round/check completed  Taken 8/8/2024 0142 by Sapna Dean RN  Safety Promotion/Fall Prevention: safety round/check completed  Taken 8/7/2024 2300 by Sapna Dean RN  Safety Promotion/Fall Prevention: safety round/check completed  Taken 8/7/2024 2158 by Sapna Dean RN  Safety Promotion/Fall Prevention: safety round/check completed  Taken 8/7/2024 2045 by Sapna Dean RN  Safety Promotion/Fall Prevention: safety round/check completed  Taken 8/7/2024 2000 by Sapna Dean RN  Safety Promotion/Fall Prevention: safety round/check completed  Taken 8/7/2024 1923 by Sapna Dean RN  Safety Promotion/Fall Prevention: safety round/check completed  Intervention: Prevent and Manage VTE (Venous Thromboembolism) Risk  Recent Flowsheet Documentation  Taken 8/7/2024 2158 by Sapna Dean RN  Activity Management: up ad gorge  VTE Prevention/Management: sequential compression devices on  Goal: Optimal Comfort and Wellbeing  Outcome: Ongoing, Progressing  Intervention: Provide Person-Centered Care  Recent Flowsheet Documentation  Taken 8/7/2024 2158 by Sapna Dean RN  Trust Relationship/Rapport:   care explained   choices provided   emotional support provided   empathic listening provided   questions answered   questions encouraged   reassurance provided   thoughts/feelings acknowledged  Goal: Readiness for Transition of Care  Outcome: Ongoing, Progressing     Problem: Skin Injury Risk Increased  Goal:  Skin Health and Integrity  Outcome: Ongoing, Progressing     Problem: Change in Fetal Wellbeing ( Delivery)  Goal: Stable Fetal Wellbeing  Outcome: Ongoing, Progressing     Problem: Infection ( Delivery)  Goal: Absence of Infection Signs and Symptoms  Outcome: Ongoing, Progressing     Problem: Respiratory Compromise ( Delivery)  Goal: Effective Oxygenation and Ventilation  Outcome: Ongoing, Progressing     Problem: Adjustment to Role Transition (Postpartum  Delivery)  Goal: Successful Maternal Role Transition  Outcome: Ongoing, Progressing     Problem: Bleeding (Postpartum  Delivery)  Goal: Hemostasis  Outcome: Ongoing, Progressing     Problem: Infection (Postpartum  Delivery)  Goal: Absence of Infection Signs and Symptoms  Outcome: Ongoing, Progressing     Problem: Pain (Postpartum  Delivery)  Goal: Acceptable Pain Control  Outcome: Ongoing, Progressing     Problem: Postoperative Nausea and Vomiting (Postpartum  Delivery)  Goal: Nausea and Vomiting Relief  Outcome: Ongoing, Progressing     Problem: Postoperative Urinary Retention (Postpartum  Delivery)  Goal: Effective Urinary Elimination  Outcome: Ongoing, Progressing     Problem: Breastfeeding  Goal: Effective Breastfeeding  Outcome: Ongoing, Progressing   Goal Outcome Evaluation:

## 2024-08-08 NOTE — LACTATION NOTE
LC in to assist with 2 breastfeeding sessions and baby was very sleepy at both. Baby wakens and latches but does not keep suckling even with moderate stimulation. LC assisted with patient pumping and instructed on the hospital breastpump and her personal wearable pumps. LC discussed that she may not have much output but the stimulation and hormonal response is important for lactogenesis. She has no pain with breastfeeding or pumping and only a few drops seen with pumping. Baby is jaundiced today and LC discussed how this can impact breastfeeding and baby being sleepier. LC instructed to continue making attempts to latch and breastfeed but if baby is not eager to move on to pumping. She has been supplementing with DEBM.

## 2024-08-08 NOTE — PLAN OF CARE
Goal Outcome Evaluation:              Outcome Evaluation: Pt up ad gorge in room. Pt pain managed with around the clock tylenol/ibuprofen. Pt tolerating po intake with no nausea/vomiting. Pt VSS. Pt having minimal vaginal bleeding. Will continue to monitor.       Arlene Lilly RN

## 2024-08-08 NOTE — PROGRESS NOTES
PostPartum/PostOp PROGRESS NOTE        Subjective:  Patient has no complaints  Pain controlled  Tolerating a regular diet  Passing flatus  Ambulating  Urinating spontaneously  Lochia decreasing, no bleeding concerns  Denies HA, vision change, or RUQ/epigastric pain  No lightheadedness or dizziness    BP mildly elevated last night and started on procardia    Objective:     Temp:  [97.3 °F (36.3 °C)-98.6 °F (37 °C)] 98.6 °F (37 °C)  Heart Rate:  [74-89] 80  Resp:  [16-20] 20  BP: (132-139)/() 134/90  General- NAD, alert and oriented, appropriate  Psych- Normal mood, good memory  Cardiovascular/Respiratory- CV- Regular rhythm, no murnurs, Resp- CTA to bases, no wheezes  Abdomen- Fundus firm, non tender, Soft, non distended, non tender, normal active bowel sounds, Incision clean, dry, intact, Staples in place, and Fundus at U-1  Extremties/DTRs- +1 edema, bilaterally equal, no signs of DVT    Results from last 7 days   Lab Units 08/08/24  0528 08/07/24  0532 08/06/24  1214   WBC 10*3/mm3 12.22* 15.96* 22.10*   HEMOGLOBIN g/dL 12.4 10.7* 12.6   HEMATOCRIT % 37.5 32.2* 37.9   PLATELETS 10*3/mm3 225 182 229       Results from last 7 days   Lab Units 08/08/24  0528 08/06/24  1214 08/06/24  0956   GLUCOSE mg/dL 85 81 104*   CREATININE mg/dL 0.62 0.65 0.77   SODIUM mmol/L 139 140 137   POTASSIUM mmol/L 4.0 3.7 4.2   CHLORIDE mmol/L 105 108* 105   AST (SGOT) U/L 25 41* 35*   ALT (SGPT) U/L 11 16 17       Blood Sugar in Office          5/21/2024    08:05   Blood Sugar in Office   Glucose 76         Results from last 7 days   Lab Units 08/05/24  1202   TREPONEMA PALLIDUM AB TOTAL  Non-Reactive     Assessment:    Post-partum/postop Day:  2  The patient is currently breastfeeding.    GHTN- controlled w procardia    Plan:   Routine postpartum/postop care  PO pain meds  Breast feeding support  Continue Procardia              Electronically signed by Jeanine Garcia DO, 08/08/24, 8:39 AM EDT.

## 2024-08-08 NOTE — PLAN OF CARE
Goal Outcome Evaluation:  Plan of Care Reviewed With: patient        Progress: improving  Outcome Evaluation: Up with assistance of 1. Up in chair x2. Up to BR and voiding without difficulty. Pain extreme this am at 08:20 and Sayda oxicodone 5 mg dose not effective.  Finally able to give 10 mg and pain level down where scheduled Tylenol and Motrin could help.Minimal vaginal bleeding. GHTN b/p 130's/90's.

## 2024-08-09 VITALS
WEIGHT: 166 LBS | HEIGHT: 60 IN | DIASTOLIC BLOOD PRESSURE: 91 MMHG | TEMPERATURE: 98.1 F | BODY MASS INDEX: 32.59 KG/M2 | RESPIRATION RATE: 16 BRPM | HEART RATE: 92 BPM | OXYGEN SATURATION: 94 % | SYSTOLIC BLOOD PRESSURE: 127 MMHG

## 2024-08-09 LAB
BH BB BLOOD EXPIRATION DATE: NORMAL
BH BB BLOOD EXPIRATION DATE: NORMAL
BH BB BLOOD TYPE BARCODE: 5100
BH BB BLOOD TYPE BARCODE: 5100
BH BB DISPENSE STATUS: NORMAL
BH BB DISPENSE STATUS: NORMAL
BH BB PRODUCT CODE: NORMAL
BH BB PRODUCT CODE: NORMAL
BH BB UNIT NUMBER: NORMAL
BH BB UNIT NUMBER: NORMAL
UNIT  ABO: NORMAL
UNIT  ABO: NORMAL
UNIT  RH: NORMAL
UNIT  RH: NORMAL

## 2024-08-09 PROCEDURE — 25010000002 MEASLES, MUMPS & RUBELLA VAC RECONSTITUTED SOLUTION: Performed by: OBSTETRICS & GYNECOLOGY

## 2024-08-09 PROCEDURE — 90471 IMMUNIZATION ADMIN: CPT | Performed by: OBSTETRICS & GYNECOLOGY

## 2024-08-09 PROCEDURE — 90707 MMR VACCINE SC: CPT | Performed by: OBSTETRICS & GYNECOLOGY

## 2024-08-09 RX ORDER — OXYCODONE HYDROCHLORIDE 5 MG/1
2.5 TABLET ORAL EVERY 4 HOURS PRN
Qty: 4 TABLET | Refills: 0 | Status: SHIPPED | OUTPATIENT
Start: 2024-08-09

## 2024-08-09 RX ORDER — NIFEDIPINE 30 MG/1
30 TABLET, EXTENDED RELEASE ORAL 2 TIMES DAILY
Qty: 60 TABLET | Refills: 0 | Status: SHIPPED | OUTPATIENT
Start: 2024-08-09 | End: 2024-08-12

## 2024-08-09 RX ADMIN — MEASLES, MUMPS, AND RUBELLA VIRUS VACCINE LIVE 0.5 ML: 1000; 12500; 1000 INJECTION, POWDER, LYOPHILIZED, FOR SUSPENSION SUBCUTANEOUS at 16:32

## 2024-08-09 RX ADMIN — ACETAMINOPHEN 650 MG: 325 TABLET ORAL at 10:42

## 2024-08-09 RX ADMIN — DOCUSATE SODIUM 100 MG: 100 CAPSULE, LIQUID FILLED ORAL at 09:03

## 2024-08-09 RX ADMIN — NIFEDIPINE 30 MG: 30 TABLET, FILM COATED, EXTENDED RELEASE ORAL at 09:03

## 2024-08-09 RX ADMIN — ACETAMINOPHEN 650 MG: 325 TABLET ORAL at 05:05

## 2024-08-09 RX ADMIN — IBUPROFEN 600 MG: 600 TABLET, FILM COATED ORAL at 12:12

## 2024-08-09 RX ADMIN — IBUPROFEN 600 MG: 600 TABLET, FILM COATED ORAL at 00:10

## 2024-08-09 RX ADMIN — IBUPROFEN 600 MG: 600 TABLET, FILM COATED ORAL at 05:05

## 2024-08-09 RX ADMIN — ACETAMINOPHEN 650 MG: 325 TABLET ORAL at 15:35

## 2024-08-09 RX ADMIN — SERTRALINE 25 MG: 25 TABLET, FILM COATED ORAL at 09:03

## 2024-08-09 NOTE — PLAN OF CARE
Problem: Adult Inpatient Plan of Care  Goal: Plan of Care Review  Outcome: Ongoing, Progressing  Goal: Patient-Specific Goal (Individualized)  Outcome: Ongoing, Progressing  Goal: Absence of Hospital-Acquired Illness or Injury  Outcome: Ongoing, Progressing  Intervention: Identify and Manage Fall Risk  Recent Flowsheet Documentation  Taken 8/9/2024 0324 by Sapna Dean RN  Safety Promotion/Fall Prevention: safety round/check completed  Taken 8/9/2024 0210 by Sapna Dean RN  Safety Promotion/Fall Prevention: safety round/check completed  Taken 8/9/2024 0057 by Sapna Dean RN  Safety Promotion/Fall Prevention: safety round/check completed  Taken 8/9/2024 0000 by Sapna Dean RN  Safety Promotion/Fall Prevention: safety round/check completed  Taken 8/8/2024 2300 by Sapna Dean RN  Safety Promotion/Fall Prevention: safety round/check completed  Taken 8/8/2024 2200 by Sapna Dean RN  Safety Promotion/Fall Prevention: safety round/check completed  Taken 8/8/2024 2049 by Sapna Dean RN  Safety Promotion/Fall Prevention: safety round/check completed  Taken 8/8/2024 2000 by Sapna Dean RN  Safety Promotion/Fall Prevention: safety round/check completed  Taken 8/8/2024 1910 by Sapna Dean RN  Safety Promotion/Fall Prevention: safety round/check completed  Intervention: Prevent and Manage VTE (Venous Thromboembolism) Risk  Recent Flowsheet Documentation  Taken 8/8/2024 2049 by Sapna Dean RN  Activity Management: up ad gorge  VTE Prevention/Management:   bilateral   sequential compression devices on  Goal: Optimal Comfort and Wellbeing  Outcome: Ongoing, Progressing  Intervention: Monitor Pain and Promote Comfort  Recent Flowsheet Documentation  Taken 8/8/2024 2049 by Sapna Dean RN  Pain Management Interventions: no interventions per patient request  Intervention: Provide Person-Centered Care  Recent Flowsheet Documentation  Taken 8/8/2024 2049 by Sapna Dean RN  Trust Relationship/Rapport:    care explained   choices provided   emotional support provided   empathic listening provided   questions answered   questions encouraged   reassurance provided   thoughts/feelings acknowledged  Goal: Readiness for Transition of Care  Outcome: Ongoing, Progressing     Problem: Skin Injury Risk Increased  Goal: Skin Health and Integrity  Outcome: Ongoing, Progressing     Problem: Infection ( Delivery)  Goal: Absence of Infection Signs and Symptoms  Outcome: Ongoing, Progressing     Problem: Respiratory Compromise ( Delivery)  Goal: Effective Oxygenation and Ventilation  Outcome: Ongoing, Progressing     Problem: Adjustment to Role Transition (Postpartum  Delivery)  Goal: Successful Maternal Role Transition  Outcome: Ongoing, Progressing     Problem: Bleeding (Postpartum  Delivery)  Goal: Hemostasis  Outcome: Ongoing, Progressing     Problem: Infection (Postpartum  Delivery)  Goal: Absence of Infection Signs and Symptoms  Outcome: Ongoing, Progressing     Problem: Pain (Postpartum  Delivery)  Goal: Acceptable Pain Control  Outcome: Ongoing, Progressing  Intervention: Prevent or Manage Pain  Recent Flowsheet Documentation  Taken 2024 by Sapna Dean, RN  Pain Management Interventions: no interventions per patient request     Problem: Postoperative Nausea and Vomiting (Postpartum  Delivery)  Goal: Nausea and Vomiting Relief  Outcome: Ongoing, Progressing     Problem: Postoperative Urinary Retention (Postpartum  Delivery)  Goal: Effective Urinary Elimination  Outcome: Ongoing, Progressing     Problem: Breastfeeding  Goal: Effective Breastfeeding  Outcome: Ongoing, Progressing   Goal Outcome Evaluation:

## 2024-08-09 NOTE — LACTATION NOTE
LC in to follow up with breastfeeding progress. Patient just pumped about 20 ml and is saving for infant's next feeding. Patient states she is still putting baby to the breast but she is still very sleepy at the breast. LC noted that infant is very jaundiced and how this can impact baby being sleepier and if baby gets bottle often that babies can be sleepy at the breast. LC encouraged an outpatient lactation visit to help address some of these things if it continues. For now added pumping with each feeding would be best.  Patient is planning on discharge today. LC discussed normal infant output patterns to expect and if infant is not waking by 3 hours to wake and feed using measures shown in the hospital. LC discussed checking to make sure new medications are safe to breastfeed. LC discussed alcohol use and cigarette/second hand smoke around baby and breastfeeding and discussed the impact of street drugs on infants and breastfeeding. LC used the page in the breastfeeding guide to discuss harmful effects of these. Breastfeeding/Lactation expectations and anticipatory guidance discussed for the next two weeks . LC discussed nipple care, plugged ducts, engorgement, and breast infection. LC encouraged mom to see pediatrician two days from discharge for follow up. Patient has a breastpump for home use and LC discussed good pumping guidelines and normal expectations with pumping and storage and preparation of ebm for feedings. LC discussed breastfeeding/lactation resources including the local breastfeeding support group after discharge and when to call the doctor. Patient showed good understanding.

## 2024-08-09 NOTE — PLAN OF CARE
Goal Outcome Evaluation:  Plan of Care Reviewed With: patient           Outcome Evaluation: care plan reviewed and updated . pt  now on procardia,monitoring pressures.

## 2024-08-12 ENCOUNTER — OFFICE VISIT (OUTPATIENT)
Dept: OBSTETRICS AND GYNECOLOGY | Facility: CLINIC | Age: 24
End: 2024-08-12
Payer: COMMERCIAL

## 2024-08-12 VITALS
DIASTOLIC BLOOD PRESSURE: 105 MMHG | BODY MASS INDEX: 29.17 KG/M2 | SYSTOLIC BLOOD PRESSURE: 148 MMHG | HEIGHT: 60 IN | HEART RATE: 99 BPM | WEIGHT: 148.6 LBS

## 2024-08-12 DIAGNOSIS — O13.3 GESTATIONAL HYPERTENSION, THIRD TRIMESTER: Primary | ICD-10-CM

## 2024-08-12 LAB
ALBUMIN SERPL-MCNC: 3.7 G/DL (ref 3.5–5.2)
ALBUMIN/GLOB SERPL: 1.2 G/DL
ALP SERPL-CCNC: 140 U/L (ref 39–117)
ALT SERPL W P-5'-P-CCNC: 16 U/L (ref 1–33)
ANION GAP SERPL CALCULATED.3IONS-SCNC: 13.8 MMOL/L (ref 5–15)
AST SERPL-CCNC: 25 U/L (ref 1–32)
BASOPHILS # BLD AUTO: 0.03 10*3/MM3 (ref 0–0.2)
BASOPHILS NFR BLD AUTO: 0.3 % (ref 0–1.5)
BILIRUB SERPL-MCNC: 0.2 MG/DL (ref 0–1.2)
BUN SERPL-MCNC: 10 MG/DL (ref 6–20)
BUN/CREAT SERPL: 20.8 (ref 7–25)
CALCIUM SPEC-SCNC: 9.7 MG/DL (ref 8.6–10.5)
CHLORIDE SERPL-SCNC: 105 MMOL/L (ref 98–107)
CO2 SERPL-SCNC: 19.2 MMOL/L (ref 22–29)
CREAT SERPL-MCNC: 0.48 MG/DL (ref 0.57–1)
DEPRECATED RDW RBC AUTO: 40 FL (ref 37–54)
EGFRCR SERPLBLD CKD-EPI 2021: 138.4 ML/MIN/1.73
EOSINOPHIL # BLD AUTO: 0.18 10*3/MM3 (ref 0–0.4)
EOSINOPHIL NFR BLD AUTO: 1.9 % (ref 0.3–6.2)
ERYTHROCYTE [DISTWIDTH] IN BLOOD BY AUTOMATED COUNT: 12.3 % (ref 12.3–15.4)
GLOBULIN UR ELPH-MCNC: 3.2 GM/DL
GLUCOSE SERPL-MCNC: 62 MG/DL (ref 65–99)
HCT VFR BLD AUTO: 44.2 % (ref 34–46.6)
HGB BLD-MCNC: 14.6 G/DL (ref 12–15.9)
IMM GRANULOCYTES # BLD AUTO: 0.04 10*3/MM3 (ref 0–0.05)
IMM GRANULOCYTES NFR BLD AUTO: 0.4 % (ref 0–0.5)
LYMPHOCYTES # BLD AUTO: 2.39 10*3/MM3 (ref 0.7–3.1)
LYMPHOCYTES NFR BLD AUTO: 25.2 % (ref 19.6–45.3)
MCH RBC QN AUTO: 29.7 PG (ref 26.6–33)
MCHC RBC AUTO-ENTMCNC: 33 G/DL (ref 31.5–35.7)
MCV RBC AUTO: 90 FL (ref 79–97)
MONOCYTES # BLD AUTO: 0.52 10*3/MM3 (ref 0.1–0.9)
MONOCYTES NFR BLD AUTO: 5.5 % (ref 5–12)
NEUTROPHILS NFR BLD AUTO: 6.34 10*3/MM3 (ref 1.7–7)
NEUTROPHILS NFR BLD AUTO: 66.7 % (ref 42.7–76)
NRBC BLD AUTO-RTO: 0 /100 WBC (ref 0–0.2)
PLATELET # BLD AUTO: 432 10*3/MM3 (ref 140–450)
PMV BLD AUTO: 9.3 FL (ref 6–12)
POTASSIUM SERPL-SCNC: 4.4 MMOL/L (ref 3.5–5.2)
PROT SERPL-MCNC: 6.9 G/DL (ref 6–8.5)
RBC # BLD AUTO: 4.91 10*6/MM3 (ref 3.77–5.28)
SODIUM SERPL-SCNC: 138 MMOL/L (ref 136–145)
WBC NRBC COR # BLD AUTO: 9.5 10*3/MM3 (ref 3.4–10.8)

## 2024-08-12 PROCEDURE — 99213 OFFICE O/P EST LOW 20 MIN: CPT | Performed by: OBSTETRICS & GYNECOLOGY

## 2024-08-12 PROCEDURE — 80053 COMPREHEN METABOLIC PANEL: CPT | Performed by: OBSTETRICS & GYNECOLOGY

## 2024-08-12 PROCEDURE — 85025 COMPLETE CBC W/AUTO DIFF WBC: CPT | Performed by: OBSTETRICS & GYNECOLOGY

## 2024-08-12 RX ORDER — NIFEDIPINE 90 MG/1
90 TABLET, EXTENDED RELEASE ORAL DAILY
Qty: 30 TABLET | Refills: 0 | Status: SHIPPED | OUTPATIENT
Start: 2024-08-12

## 2024-08-14 ENCOUNTER — TELEPHONE (OUTPATIENT)
Dept: OBSTETRICS AND GYNECOLOGY | Facility: CLINIC | Age: 24
End: 2024-08-14
Payer: COMMERCIAL

## 2024-08-14 RX ORDER — ACETAMINOPHEN 325 MG/1
650 TABLET ORAL EVERY 6 HOURS PRN
Qty: 30 TABLET | Refills: 0 | Status: SHIPPED | OUTPATIENT
Start: 2024-08-14

## 2024-08-14 NOTE — TELEPHONE ENCOUNTER
Caller: GYPSY NEWTON    Relationship: SELF    Best call back number: [phone]    Requested Prescriptions: acetaminophen (Tylenol) 325 MG tablet   Requested Prescriptions      No prescriptions requested or ordered in this encounter        Pharmacy where request should be sent:    [facility] - [place], [place] - [address] [address] - [phone]  - [phone]    [address] [address] [address]   Phone: [phone] Fax: [phone]   Hours: Not open 24 hours       Last office visit with prescribing clinician: 8/12/2024     Next office visit with prescribing clinician: 8/20/2024     Additional details provided by patient: PT CALLED BACK REGARDING RX AND WAS SENT TO THE INCORRECT PHARM, PT IS REQUESTING RX TO BE SENT TO THE ABOVE PHARM        Would you like a call back once the refill request has been completed: [x] Yes [] No        Alexx Naidu Rep   08/14/24 15:18 EDT         DELETE AFTER READING TO PATIENT: “Thank you for sharing this information with me. I will send a message to the clinical team. Please allow 48 hours for the clinical staff to follow up on this request.”

## 2024-08-14 NOTE — TELEPHONE ENCOUNTER
Patient called states Rx for Tylenol sent to wrong pharmacy.  Last filled 8/8/24 Tylenol # 30 with 1 refill sent to [facility] requesting Rd be sent to [facility].  Nilo'd Rx for Tylenol # 30 with no refills.

## 2024-08-14 NOTE — TELEPHONE ENCOUNTER
Called patient concerning Rx refill on Tylenol.  Informed her an Rx was sent on 8/8/24 Tylenol # 30 with 1 refill.  She stated hadn't picked up refill

## 2024-08-14 NOTE — TELEPHONE ENCOUNTER
Caller: GYPSY NEWTON    Relationship: SELF    Best call back number: [phone]    Requested Prescriptions: acetaminophen (Tylenol) 325 MG tablet   Requested Prescriptions      No prescriptions requested or ordered in this encounter        Pharmacy where request should be sent:    [facility] - [place], [place] - [address] [address] - [phone]  - [phone]    [id number] [place] [address]   Phone: [phone] Fax: [phone]   Hours: Not open 24 hours       Last office visit with prescribing clinician: 8/12/2024   Last telemedicine visit with prescribing clinician: Visit date not found   Next office visit with prescribing clinician: 8/20/2024     Additional details provided by patient: PT IS REQ A MESSAGE SENT THROUGH [facility] WHEN REFILL IS SENT        Alexx Naidu   08/14/24 12:04 EDT

## 2024-08-17 ENCOUNTER — TELEPHONE (OUTPATIENT)
Dept: LACTATION | Facility: HOSPITAL | Age: 24
End: 2024-08-17
Payer: COMMERCIAL

## 2024-08-19 NOTE — PROGRESS NOTES
"Post Delivery EARLY Follow up (1-2weeks)        Chief Complaint   Patient presents with    Postpartum Care     2wk PP BP check     Date of delivery: 2024  Delivery type:   LTCS  Perineum : Intact  Feeding: Breast    HPI:     HA:  yes yesterday, gone w tylenol  Vision changes:  no  RUQ/epigastric pain:  no  Swelling:  yes, but better    Pain:  no  Vaginal Bleeding:  yes light normal    Depressed/Anxious:  no  EPDS score: 0   #10: 0    Weight at last OB OV: 166lbs    Discharge Summary by Jeanine Garcia DO (2024 08:42)  Progress Notes by Jeanine Garcia DO (2024 10:15)  Comprehensive Metabolic Panel (2024 11:35)  CBC & Differential (2024 11:35)    Last office visit Procardia increased to 90 XL daily.  Labs were within normal limits    PHYSICAL EXAM:  /84   Pulse 96   Ht 152.4 cm (60\")   Wt 64 kg (141 lb)   LMP 2023   Breastfeeding Yes   BMI 27.54 kg/m²  17.2 kg (38 lb)  General- NAD, alert and oriented, appropriate  Psych- Normal mood, good memory, good eye contact        INCISION : Clean, dry, intact, no evidence of infection, steri strips in place  Abdomen- Soft, non distended, non tender, no masses  Extremities- Trace edema, bilaterally equal, no signs of DVT    ASSESSMENT AND PLAN:  Diagnoses and all orders for this visit:    1. Postpartum hypertension (Primary)  Comments:  Improved      Counseling:   Return to office for HA unrelieved w rest/hydration/OTC meds, vision changes, increase in edema, RUQ/epigastric pain, any concerns.  Postpartum/Postop  precautions reviewed.  Okay to remove Steri-Strips    Decrease blood pressure medication at home as needed for lower blood pressures.  Check blood pressure prior to taking Procardia    Follow Up:  Return in about 2 weeks (around 9/3/2024) for Postpartum.            Jeanine Garcia DO  2024    [facility]  [facility] [place] [address]  Dept: " [phone]  Dept Fax: [phone]  Loc: [phone]  Loc Fax: [phone]

## 2024-08-20 ENCOUNTER — POSTPARTUM VISIT (OUTPATIENT)
Dept: OBSTETRICS AND GYNECOLOGY | Facility: CLINIC | Age: 24
End: 2024-08-20
Payer: COMMERCIAL

## 2024-08-20 VITALS
HEIGHT: 60 IN | DIASTOLIC BLOOD PRESSURE: 84 MMHG | SYSTOLIC BLOOD PRESSURE: 127 MMHG | HEART RATE: 96 BPM | BODY MASS INDEX: 27.68 KG/M2 | WEIGHT: 141 LBS

## 2024-08-20 PROCEDURE — 99213 OFFICE O/P EST LOW 20 MIN: CPT | Performed by: OBSTETRICS & GYNECOLOGY

## 2024-08-28 NOTE — PROGRESS NOTES
"POSTPARTUM Follow Up Visit      Chief Complaint   Patient presents with    Postpartum Care     HPI:    Date of delivery: 2024  Delivery type:   LTCS  Delivering Provider:   Dr. Jeanine Garcia        Feeding: Bottle, Pumping  Pain:  no  Vaginal Bleeding:  no  Plans for BC:  Birth control pills    Depressed/Anxious:  no   History of anxiety on Zoloft 25 mg  EPDS score: 0  #10: 0    Weight at last OB OV:  166LBS   Last pap date and result: PAP, Liquid Based ([facility] Only) (2024 14:16) Pap only negative    Admission (Discharged) with Jeanine Garcia DO (2024)  Postpartum Visit with Jeanine Garcia DO (2024)    Gestational hypertension-was weaning off of Procardia.  Now off procardia.     PHYSICAL EXAM:  /85   Pulse 80   Ht 152.4 cm (60\")   Wt 64.1 kg (141 lb 6.4 oz)   Breastfeeding No   BMI 27.62 kg/m²  Not found.  General- NAD, alert and oriented, appropriate  Psych- Normal mood, good memory, good eye contact    INCISION : Clean, dry, intact, no evidence of infection  Abdomen- Soft, non distended, non tender, no masses     Chaperone present during pelvic exam.  External genitalia- Normal.    Urethra/Bladder/Vagina- Normal, no masses, non-tender  Prolapse : none noted, not examined with split speculum to delineate   Cervix- Normal, no lesions, no discharge, no CMT  Uterus- Normal size, shape & consistency.  Non tender, mobile.  Adnexa- Normal, no mass, non-tender    Lymphatic- No palpable groin nodes  Extremities- No edema    ASSESSMENT AND PLAN:  Diagnoses and all orders for this visit:    1. Postpartum follow-up (Primary)    2. Postpartum hypertension  Comments:  RESOLVED    3. Birth control counseling  -     norethindrone (MICRONOR) 0.35 MG tablet; Take 1 tablet by mouth Daily.  Dispense: 90 tablet; Refill: 4    4. Anxiety  Comments:  Stable, con't zoloft  Orders:  -     sertraline (ZOLOFT) 25 MG tablet; Take 1 tablet by mouth Daily.  Dispense: 90 tablet; Refill: 4    Other orders  -    "  Prenatal Vit-Fe Fum-FA-Omega (Prenatal Multi +DHA) 27-0.8-228 MG capsule; Take 1 capsule by mouth Daily.  Dispense: 90 capsule; Refill: 4      Counseling:    FRAN risk w hormonal BIRTH CONTROL reviewed (estrogen containing only), S/Sx to watch for discussed and questions answered.  Newer studies indicate possible increased breast cancer reviewed (both estrogen and progestin only).  Ok to resume intercourse  May resume normal activities  Core strengthening exercises reviewed and recommended  Kegel exercises reviewed and recommended  Ok to return to work/school once patient desires/maternity leave completed  Use backup contraception for 4 weeks after initiating chosen BC        Follow Up:  Return in about 1 year (around 9/3/2025) for WWE.          Jeanine Garcia,   09/03/2024    [facility]  [address] [place] [address]  Dept: [phone]  Dept Fax: [phone]  Loc: [phone]  Loc Fax: [phone]

## 2024-09-02 PROBLEM — Z34.80 SUPERVISION OF OTHER NORMAL PREGNANCY, ANTEPARTUM: Status: RESOLVED | Noted: 2024-01-09 | Resolved: 2024-09-02

## 2024-09-02 PROBLEM — O42.90 AMNIOTIC FLUID LEAKING: Status: RESOLVED | Noted: 2024-08-05 | Resolved: 2024-09-02

## 2024-09-02 PROBLEM — O40.3XX0 POLYHYDRAMNIOS IN THIRD TRIMESTER: Status: RESOLVED | Noted: 2024-08-05 | Resolved: 2024-09-02

## 2024-09-02 PROBLEM — O13.3 GESTATIONAL HYPERTENSION, THIRD TRIMESTER: Status: RESOLVED | Noted: 2024-08-05 | Resolved: 2024-09-02

## 2024-09-03 ENCOUNTER — POSTPARTUM VISIT (OUTPATIENT)
Dept: OBSTETRICS AND GYNECOLOGY | Facility: CLINIC | Age: 24
End: 2024-09-03
Payer: COMMERCIAL

## 2024-09-03 VITALS
WEIGHT: 141.4 LBS | DIASTOLIC BLOOD PRESSURE: 85 MMHG | HEIGHT: 60 IN | HEART RATE: 80 BPM | SYSTOLIC BLOOD PRESSURE: 123 MMHG | BODY MASS INDEX: 27.76 KG/M2

## 2024-09-03 DIAGNOSIS — Z30.09 BIRTH CONTROL COUNSELING: ICD-10-CM

## 2024-09-03 DIAGNOSIS — F41.9 ANXIETY: ICD-10-CM

## 2024-09-03 PROCEDURE — 0503F POSTPARTUM CARE VISIT: CPT | Performed by: OBSTETRICS & GYNECOLOGY

## 2024-09-03 RX ORDER — SERTRALINE HYDROCHLORIDE 25 MG/1
25 TABLET, FILM COATED ORAL DAILY
Qty: 90 TABLET | Refills: 4 | Status: SHIPPED | OUTPATIENT
Start: 2024-09-03

## 2024-09-03 RX ORDER — CHOLECALCIFEROL (VITAMIN D3) 25 MCG
1 TABLET,CHEWABLE ORAL DAILY
Qty: 90 CAPSULE | Refills: 4 | Status: SHIPPED | OUTPATIENT
Start: 2024-09-03

## 2024-09-03 RX ORDER — ACETAMINOPHEN AND CODEINE PHOSPHATE 120; 12 MG/5ML; MG/5ML
1 SOLUTION ORAL DAILY
Qty: 90 TABLET | Refills: 4 | Status: SHIPPED | OUTPATIENT
Start: 2024-09-03

## 2024-09-11 LAB
ABO GROUP BLD: NORMAL
BLD GP AB SCN SERPL QL: NEGATIVE
RH BLD: POSITIVE
T&S EXPIRATION DATE: NORMAL

## 2024-12-16 ENCOUNTER — TELEPHONE (OUTPATIENT)
Dept: UROLOGY | Age: 24
End: 2024-12-16

## 2025-01-02 DIAGNOSIS — F41.9 ANXIETY: ICD-10-CM

## 2025-01-03 RX ORDER — SERTRALINE HYDROCHLORIDE 25 MG/1
25 TABLET, FILM COATED ORAL DAILY
Qty: 30 TABLET | Refills: 5 | OUTPATIENT
Start: 2025-01-03

## 2025-01-03 NOTE — TELEPHONE ENCOUNTER
Patient had rx transferred to [facility] in [place] on 10/26/2024 and they said that she has 4 refills left on that rx. TF

## (undated) DEVICE — GOWN,REINF,POLY,SIRUS,BRTH SLV,XLNG/XXL: Brand: MEDLINE

## (undated) DEVICE — LARGE VISCERA RETAINER: Brand: VISCERA RETAINER, FISH

## (undated) DEVICE — SUT VIC 0 CTX 36IN J978H

## (undated) DEVICE — GLV SURG BIOGEL LTX PF 6 1/2

## (undated) DEVICE — TRY CATH FOL ADVANCE SIL W/BAG 16F

## (undated) DEVICE — PAD GRND REM POLYHESIVE A/ DISP

## (undated) DEVICE — DEV TRANSF BLD W/LUER ADPT CA/198

## (undated) DEVICE — INTENDED FOR TISSUE SEPARATION, AND OTHER PROCEDURES THAT REQUIRE A SHARP SURGICAL BLADE TO PUNCTURE OR CUT.: Brand: BARD-PARKER ® CARBON RIB-BACK BLADES

## (undated) DEVICE — NEEDLE,18GX1.5",REG,BEVEL: Brand: MEDLINE

## (undated) DEVICE — SUT MNCRYL 3/0 CT1 36 IN Y944H

## (undated) DEVICE — CVR HNDL LT SURG ACCSSRY BLU STRL

## (undated) DEVICE — SUT VICRYL 3/0 CT1 27IN J258H

## (undated) DEVICE — Device: Brand: PORTEX

## (undated) DEVICE — STERILE POLYISOPRENE POWDER-FREE SURGICAL GLOVES WITH EMOLLIENT COATING: Brand: PROTEXIS

## (undated) DEVICE — PROXIMATE RH ROTATING HEAD SKIN STAPLERS (35 WIDE) CONTAINS 35 STAINLESS STEEL STAPLES: Brand: PROXIMATE

## (undated) DEVICE — VIOLET BRAIDED (POLYGLACTIN 910), SYNTHETIC ABSORBABLE SUTURE: Brand: COATED VICRYL

## (undated) DEVICE — SUT CHRM 0 CT1 36IN 924H

## (undated) DEVICE — C SECTION PACK: Brand: MEDLINE INDUSTRIES, INC.

## (undated) DEVICE — PCH TISS LAPSAC SURG 8X10IN